# Patient Record
Sex: FEMALE | Race: WHITE | NOT HISPANIC OR LATINO | Employment: OTHER | URBAN - METROPOLITAN AREA
[De-identification: names, ages, dates, MRNs, and addresses within clinical notes are randomized per-mention and may not be internally consistent; named-entity substitution may affect disease eponyms.]

---

## 2022-01-01 ENCOUNTER — DISCHARGE SUMMARY NURSING HOME (OUTPATIENT)
Dept: GERIATRICS | Facility: CLINIC | Age: 82
End: 2022-01-01
Payer: COMMERCIAL

## 2022-01-01 ENCOUNTER — TRANSFERRED RECORDS (OUTPATIENT)
Dept: HEALTH INFORMATION MANAGEMENT | Facility: CLINIC | Age: 82
End: 2022-01-01

## 2022-01-01 ENCOUNTER — APPOINTMENT (OUTPATIENT)
Dept: MRI IMAGING | Facility: CLINIC | Age: 82
End: 2022-01-01
Attending: FAMILY MEDICINE
Payer: MEDICARE

## 2022-01-01 ENCOUNTER — APPOINTMENT (OUTPATIENT)
Dept: CARDIOLOGY | Facility: CLINIC | Age: 82
End: 2022-01-01
Attending: INTERNAL MEDICINE
Payer: MEDICARE

## 2022-01-01 ENCOUNTER — LAB REQUISITION (OUTPATIENT)
Dept: LAB | Facility: CLINIC | Age: 82
End: 2022-01-01
Payer: MEDICARE

## 2022-01-01 ENCOUNTER — TRANSITIONAL CARE UNIT VISIT (OUTPATIENT)
Dept: GERIATRICS | Facility: CLINIC | Age: 82
End: 2022-01-01
Payer: COMMERCIAL

## 2022-01-01 ENCOUNTER — MEDICAL CORRESPONDENCE (OUTPATIENT)
Dept: HEALTH INFORMATION MANAGEMENT | Facility: CLINIC | Age: 82
End: 2022-01-01

## 2022-01-01 ENCOUNTER — LAB REQUISITION (OUTPATIENT)
Dept: LAB | Facility: CLINIC | Age: 82
End: 2022-01-01
Payer: COMMERCIAL

## 2022-01-01 ENCOUNTER — APPOINTMENT (OUTPATIENT)
Dept: PHYSICAL THERAPY | Facility: CLINIC | Age: 82
End: 2022-01-01
Attending: INTERNAL MEDICINE
Payer: MEDICARE

## 2022-01-01 ENCOUNTER — PATIENT OUTREACH (OUTPATIENT)
Dept: CARE COORDINATION | Facility: CLINIC | Age: 82
End: 2022-01-01
Payer: COMMERCIAL

## 2022-01-01 ENCOUNTER — APPOINTMENT (OUTPATIENT)
Dept: CT IMAGING | Facility: CLINIC | Age: 82
End: 2022-01-01
Attending: FAMILY MEDICINE
Payer: MEDICARE

## 2022-01-01 ENCOUNTER — TELEPHONE (OUTPATIENT)
Dept: GERIATRICS | Facility: CLINIC | Age: 82
End: 2022-01-01
Payer: COMMERCIAL

## 2022-01-01 ENCOUNTER — APPOINTMENT (OUTPATIENT)
Dept: OCCUPATIONAL THERAPY | Facility: CLINIC | Age: 82
End: 2022-01-01
Attending: INTERNAL MEDICINE
Payer: MEDICARE

## 2022-01-01 ENCOUNTER — TRANSCRIBE ORDERS (OUTPATIENT)
Dept: OTHER | Age: 82
End: 2022-01-01

## 2022-01-01 ENCOUNTER — TELEPHONE (OUTPATIENT)
Dept: GERIATRICS | Facility: CLINIC | Age: 82
End: 2022-01-01

## 2022-01-01 ENCOUNTER — HOSPITAL ENCOUNTER (OUTPATIENT)
Facility: CLINIC | Age: 82
Setting detail: OBSERVATION
Discharge: HOME | End: 2022-05-07
Attending: FAMILY MEDICINE | Admitting: INTERNAL MEDICINE
Payer: MEDICARE

## 2022-01-01 VITALS
BODY MASS INDEX: 17.93 KG/M2 | WEIGHT: 105 LBS | OXYGEN SATURATION: 99 % | TEMPERATURE: 96.8 F | DIASTOLIC BLOOD PRESSURE: 75 MMHG | HEIGHT: 64 IN | HEART RATE: 81 BPM | SYSTOLIC BLOOD PRESSURE: 106 MMHG | RESPIRATION RATE: 20 BRPM

## 2022-01-01 VITALS
RESPIRATION RATE: 18 BRPM | DIASTOLIC BLOOD PRESSURE: 78 MMHG | BODY MASS INDEX: 17.93 KG/M2 | TEMPERATURE: 98.3 F | HEIGHT: 64 IN | OXYGEN SATURATION: 94 % | WEIGHT: 105 LBS | SYSTOLIC BLOOD PRESSURE: 110 MMHG | HEART RATE: 88 BPM

## 2022-01-01 VITALS
HEIGHT: 64 IN | RESPIRATION RATE: 20 BRPM | BODY MASS INDEX: 18.27 KG/M2 | WEIGHT: 107 LBS | OXYGEN SATURATION: 96 % | TEMPERATURE: 98.2 F | SYSTOLIC BLOOD PRESSURE: 108 MMHG | HEART RATE: 82 BPM | DIASTOLIC BLOOD PRESSURE: 73 MMHG

## 2022-01-01 VITALS
HEART RATE: 63 BPM | RESPIRATION RATE: 20 BRPM | SYSTOLIC BLOOD PRESSURE: 147 MMHG | BODY MASS INDEX: 18.3 KG/M2 | HEIGHT: 64 IN | OXYGEN SATURATION: 95 % | WEIGHT: 107.2 LBS | DIASTOLIC BLOOD PRESSURE: 87 MMHG | TEMPERATURE: 97.1 F

## 2022-01-01 VITALS
TEMPERATURE: 97.5 F | OXYGEN SATURATION: 98 % | RESPIRATION RATE: 20 BRPM | SYSTOLIC BLOOD PRESSURE: 118 MMHG | HEIGHT: 64 IN | BODY MASS INDEX: 17.93 KG/M2 | HEART RATE: 102 BPM | DIASTOLIC BLOOD PRESSURE: 74 MMHG | WEIGHT: 105 LBS

## 2022-01-01 VITALS
SYSTOLIC BLOOD PRESSURE: 129 MMHG | HEIGHT: 64 IN | BODY MASS INDEX: 17.93 KG/M2 | TEMPERATURE: 96.8 F | OXYGEN SATURATION: 97 % | DIASTOLIC BLOOD PRESSURE: 77 MMHG | RESPIRATION RATE: 20 BRPM | WEIGHT: 105 LBS | HEART RATE: 71 BPM

## 2022-01-01 VITALS
HEART RATE: 64 BPM | RESPIRATION RATE: 18 BRPM | HEIGHT: 64 IN | OXYGEN SATURATION: 96 % | WEIGHT: 107 LBS | BODY MASS INDEX: 18.27 KG/M2 | TEMPERATURE: 97 F | DIASTOLIC BLOOD PRESSURE: 57 MMHG | SYSTOLIC BLOOD PRESSURE: 113 MMHG

## 2022-01-01 VITALS
SYSTOLIC BLOOD PRESSURE: 99 MMHG | RESPIRATION RATE: 20 BRPM | HEART RATE: 135 BPM | DIASTOLIC BLOOD PRESSURE: 51 MMHG | BODY MASS INDEX: 17.42 KG/M2 | OXYGEN SATURATION: 98 % | HEIGHT: 64 IN | WEIGHT: 102 LBS | TEMPERATURE: 96.8 F

## 2022-01-01 VITALS
OXYGEN SATURATION: 96 % | BODY MASS INDEX: 18.1 KG/M2 | HEART RATE: 85 BPM | HEIGHT: 64 IN | WEIGHT: 106 LBS | RESPIRATION RATE: 20 BRPM | TEMPERATURE: 97.2 F | DIASTOLIC BLOOD PRESSURE: 80 MMHG | SYSTOLIC BLOOD PRESSURE: 128 MMHG

## 2022-01-01 VITALS
HEART RATE: 66 BPM | SYSTOLIC BLOOD PRESSURE: 105 MMHG | BODY MASS INDEX: 17.75 KG/M2 | TEMPERATURE: 97 F | HEIGHT: 64 IN | RESPIRATION RATE: 20 BRPM | DIASTOLIC BLOOD PRESSURE: 68 MMHG | WEIGHT: 104 LBS | OXYGEN SATURATION: 91 %

## 2022-01-01 DIAGNOSIS — S32.10XD CLOSED FRACTURE OF SACRUM WITH ROUTINE HEALING, UNSPECIFIED PORTION OF SACRUM, SUBSEQUENT ENCOUNTER: Primary | ICD-10-CM

## 2022-01-01 DIAGNOSIS — I20.89 ANGINA OF EFFORT (H): ICD-10-CM

## 2022-01-01 DIAGNOSIS — R52 PAIN MANAGEMENT: ICD-10-CM

## 2022-01-01 DIAGNOSIS — I10 PRIMARY HYPERTENSION: ICD-10-CM

## 2022-01-01 DIAGNOSIS — S22.000A COMPRESSION FRACTURE OF THORACIC VERTEBRA, CLOSED, INITIAL ENCOUNTER (H): ICD-10-CM

## 2022-01-01 DIAGNOSIS — D72.819 DECREASED WHITE BLOOD CELL COUNT, UNSPECIFIED: ICD-10-CM

## 2022-01-01 DIAGNOSIS — R47.01 APHASIA: ICD-10-CM

## 2022-01-01 DIAGNOSIS — Z09 ENCOUNTER FOR FOLLOW-UP EXAMINATION AFTER COMPLETED TREATMENT FOR CONDITIONS OTHER THAN MALIGNANT NEOPLASM: ICD-10-CM

## 2022-01-01 DIAGNOSIS — M81.0 OSTEOPOROSIS, UNSPECIFIED OSTEOPOROSIS TYPE, UNSPECIFIED PATHOLOGICAL FRACTURE PRESENCE: Primary | ICD-10-CM

## 2022-01-01 DIAGNOSIS — I10 ESSENTIAL (PRIMARY) HYPERTENSION: ICD-10-CM

## 2022-01-01 DIAGNOSIS — R53.81 PHYSICAL DECONDITIONING: ICD-10-CM

## 2022-01-01 DIAGNOSIS — Z71.89 OTHER SPECIFIED COUNSELING: ICD-10-CM

## 2022-01-01 DIAGNOSIS — E83.52 HYPERCALCEMIA: ICD-10-CM

## 2022-01-01 DIAGNOSIS — I48.0 PAROXYSMAL ATRIAL FIBRILLATION (H): ICD-10-CM

## 2022-01-01 DIAGNOSIS — E87.6 HYPOKALEMIA: ICD-10-CM

## 2022-01-01 DIAGNOSIS — Z79.01 ADEQUATE ANTICOAGULATION ON ANTICOAGULANT THERAPY: ICD-10-CM

## 2022-01-01 DIAGNOSIS — I48.91 UNSPECIFIED ATRIAL FIBRILLATION (H): ICD-10-CM

## 2022-01-01 DIAGNOSIS — M81.0 OSTEOPOROSIS, UNSPECIFIED OSTEOPOROSIS TYPE, UNSPECIFIED PATHOLOGICAL FRACTURE PRESENCE: ICD-10-CM

## 2022-01-01 DIAGNOSIS — F03.90 DEMENTIA WITHOUT BEHAVIORAL DISTURBANCE, UNSPECIFIED DEMENTIA TYPE: ICD-10-CM

## 2022-01-01 DIAGNOSIS — F03.90 DEMENTIA WITHOUT BEHAVIORAL DISTURBANCE, UNSPECIFIED DEMENTIA TYPE: Primary | ICD-10-CM

## 2022-01-01 DIAGNOSIS — R47.9 SPEECH DISTURBANCE, UNSPECIFIED TYPE: Primary | ICD-10-CM

## 2022-01-01 DIAGNOSIS — Z66 DNR (DO NOT RESUSCITATE): ICD-10-CM

## 2022-01-01 LAB
ALBUMIN UR-MCNC: NEGATIVE MG/DL
ANION GAP SERPL CALCULATED.3IONS-SCNC: 13 MMOL/L (ref 5–18)
ANION GAP SERPL CALCULATED.3IONS-SCNC: 14 MMOL/L (ref 5–18)
ANION GAP SERPL CALCULATED.3IONS-SCNC: 18 MMOL/L (ref 5–18)
ANION GAP SERPL CALCULATED.3IONS-SCNC: 9 MMOL/L (ref 5–18)
APPEARANCE UR: CLEAR
APTT PPP: 28 SECONDS (ref 22–38)
ATRIAL RATE - MUSE: 76 BPM
BASOPHILS # BLD AUTO: 0 10E3/UL (ref 0–0.2)
BASOPHILS # BLD AUTO: 0 10E3/UL (ref 0–0.2)
BASOPHILS NFR BLD AUTO: 1 %
BASOPHILS NFR BLD AUTO: 1 %
BILIRUB UR QL STRIP: NEGATIVE
BUN SERPL-MCNC: 11 MG/DL (ref 8–28)
BUN SERPL-MCNC: 16 MG/DL (ref 8–28)
BUN SERPL-MCNC: 19 MG/DL (ref 8–28)
BUN SERPL-MCNC: 23 MG/DL (ref 8–28)
CA-I BLD-MCNC: 4.7 MG/DL (ref 4.4–5.2)
CALCIUM SERPL-MCNC: 10.8 MG/DL (ref 8.5–10.5)
CALCIUM SERPL-MCNC: 9 MG/DL (ref 8.5–10.5)
CALCIUM SERPL-MCNC: 9.2 MG/DL (ref 8.5–10.5)
CALCIUM SERPL-MCNC: 9.3 MG/DL (ref 8.5–10.5)
CHLORIDE BLD-SCNC: 95 MMOL/L (ref 98–107)
CHLORIDE BLD-SCNC: 95 MMOL/L (ref 98–107)
CHLORIDE BLD-SCNC: 97 MMOL/L (ref 98–107)
CHLORIDE BLD-SCNC: 98 MMOL/L (ref 98–107)
CHOLEST SERPL-MCNC: 129 MG/DL
CO2 SERPL-SCNC: 21 MMOL/L (ref 22–31)
CO2 SERPL-SCNC: 25 MMOL/L (ref 22–31)
CO2 SERPL-SCNC: 27 MMOL/L (ref 22–31)
CO2 SERPL-SCNC: 28 MMOL/L (ref 22–31)
COLOR UR AUTO: ABNORMAL
CREAT BLD-MCNC: 0.7 MG/DL (ref 0.6–1.1)
CREAT SERPL-MCNC: 0.63 MG/DL (ref 0.6–1.1)
CREAT SERPL-MCNC: 0.67 MG/DL (ref 0.6–1.1)
CREAT SERPL-MCNC: 0.76 MG/DL (ref 0.6–1.1)
CREAT SERPL-MCNC: 0.85 MG/DL (ref 0.6–1.1)
DIASTOLIC BLOOD PRESSURE - MUSE: NORMAL MMHG
EOSINOPHIL # BLD AUTO: 0 10E3/UL (ref 0–0.7)
EOSINOPHIL # BLD AUTO: 0.1 10E3/UL (ref 0–0.7)
EOSINOPHIL NFR BLD AUTO: 1 %
EOSINOPHIL NFR BLD AUTO: 1 %
ERYTHROCYTE [DISTWIDTH] IN BLOOD BY AUTOMATED COUNT: 12.1 % (ref 10–15)
ERYTHROCYTE [DISTWIDTH] IN BLOOD BY AUTOMATED COUNT: 12.6 % (ref 10–15)
ERYTHROCYTE [DISTWIDTH] IN BLOOD BY AUTOMATED COUNT: 12.9 % (ref 10–15)
FOLATE SERPL-MCNC: 14.8 NG/ML
GFR SERPL CREATININE-BSD FRML MDRD: 67 ML/MIN/1.73M2
GFR SERPL CREATININE-BSD FRML MDRD: 77 ML/MIN/1.73M2
GFR SERPL CREATININE-BSD FRML MDRD: 86 ML/MIN/1.73M2
GFR SERPL CREATININE-BSD FRML MDRD: 87 ML/MIN/1.73M2
GFR SERPL CREATININE-BSD FRML MDRD: >60 ML/MIN/1.73M2
GLUCOSE BLD-MCNC: 101 MG/DL (ref 70–125)
GLUCOSE BLD-MCNC: 84 MG/DL (ref 70–125)
GLUCOSE BLD-MCNC: 85 MG/DL (ref 70–125)
GLUCOSE BLD-MCNC: 99 MG/DL (ref 70–125)
GLUCOSE BLDC GLUCOMTR-MCNC: 90 MG/DL (ref 70–99)
GLUCOSE BLDC GLUCOMTR-MCNC: 93 MG/DL (ref 70–99)
GLUCOSE BLDC GLUCOMTR-MCNC: 95 MG/DL (ref 70–99)
GLUCOSE BLDC GLUCOMTR-MCNC: 97 MG/DL (ref 70–99)
GLUCOSE UR STRIP-MCNC: NEGATIVE MG/DL
HBA1C MFR BLD: 5.2 %
HCT VFR BLD AUTO: 36.9 % (ref 35–47)
HCT VFR BLD AUTO: 39.9 % (ref 35–47)
HCT VFR BLD AUTO: 40.1 % (ref 35–47)
HDLC SERPL-MCNC: 54 MG/DL
HGB BLD-MCNC: 12.7 G/DL (ref 11.7–15.7)
HGB BLD-MCNC: 13.6 G/DL (ref 11.7–15.7)
HGB BLD-MCNC: 13.7 G/DL (ref 11.7–15.7)
HGB UR QL STRIP: NEGATIVE
HOLD SPECIMEN: NORMAL
IMM GRANULOCYTES # BLD: 0 10E3/UL
IMM GRANULOCYTES # BLD: 0 10E3/UL
IMM GRANULOCYTES NFR BLD: 0 %
IMM GRANULOCYTES NFR BLD: 1 %
INR PPP: 1.52 (ref 0.85–1.15)
INTERPRETATION ECG - MUSE: NORMAL
KETONES UR STRIP-MCNC: ABNORMAL MG/DL
LDLC SERPL CALC-MCNC: 62 MG/DL
LEUKOCYTE ESTERASE UR QL STRIP: ABNORMAL
LYMPHOCYTES # BLD AUTO: 1 10E3/UL (ref 0.8–5.3)
LYMPHOCYTES # BLD AUTO: 1.4 10E3/UL (ref 0.8–5.3)
LYMPHOCYTES NFR BLD AUTO: 23 %
LYMPHOCYTES NFR BLD AUTO: 25 %
MAGNESIUM SERPL-MCNC: 1.5 MG/DL (ref 1.8–2.6)
MAGNESIUM SERPL-MCNC: 1.8 MG/DL (ref 1.8–2.6)
MCH RBC QN AUTO: 34.3 PG (ref 26.5–33)
MCH RBC QN AUTO: 34.7 PG (ref 26.5–33)
MCH RBC QN AUTO: 35.5 PG (ref 26.5–33)
MCHC RBC AUTO-ENTMCNC: 33.9 G/DL (ref 31.5–36.5)
MCHC RBC AUTO-ENTMCNC: 34.3 G/DL (ref 31.5–36.5)
MCHC RBC AUTO-ENTMCNC: 34.4 G/DL (ref 31.5–36.5)
MCV RBC AUTO: 100 FL (ref 78–100)
MCV RBC AUTO: 102 FL (ref 78–100)
MCV RBC AUTO: 103 FL (ref 78–100)
MONOCYTES # BLD AUTO: 0.4 10E3/UL (ref 0–1.3)
MONOCYTES # BLD AUTO: 0.6 10E3/UL (ref 0–1.3)
MONOCYTES NFR BLD AUTO: 10 %
MONOCYTES NFR BLD AUTO: 9 %
MUCOUS THREADS #/AREA URNS LPF: PRESENT /LPF
NEUTROPHILS # BLD AUTO: 2.8 10E3/UL (ref 1.6–8.3)
NEUTROPHILS # BLD AUTO: 3.5 10E3/UL (ref 1.6–8.3)
NEUTROPHILS NFR BLD AUTO: 62 %
NEUTROPHILS NFR BLD AUTO: 66 %
NITRATE UR QL: NEGATIVE
NRBC # BLD AUTO: 0 10E3/UL
NRBC # BLD AUTO: 0 10E3/UL
NRBC BLD AUTO-RTO: 0 /100
NRBC BLD AUTO-RTO: 0 /100
P AXIS - MUSE: 64 DEGREES
PH UR STRIP: 6.5 [PH] (ref 5–7)
PHQ9 SCORE: 2
PLATELET # BLD AUTO: 224 10E3/UL (ref 150–450)
PLATELET # BLD AUTO: 233 10E3/UL (ref 150–450)
PLATELET # BLD AUTO: 245 10E3/UL (ref 150–450)
POTASSIUM BLD-SCNC: 3 MMOL/L (ref 3.5–5)
POTASSIUM BLD-SCNC: 3.1 MMOL/L (ref 3.5–5)
POTASSIUM BLD-SCNC: 3.3 MMOL/L (ref 3.5–5)
POTASSIUM BLD-SCNC: 3.5 MMOL/L (ref 3.5–5)
POTASSIUM BLD-SCNC: 3.7 MMOL/L (ref 3.5–5)
POTASSIUM BLD-SCNC: 3.9 MMOL/L (ref 3.5–5)
POTASSIUM BLD-SCNC: 5.4 MMOL/L (ref 3.5–5)
PR INTERVAL - MUSE: 136 MS
PTH-INTACT SERPL-MCNC: 34 PG/ML (ref 15–65)
PTH-INTACT SERPL-MCNC: 37 PG/ML (ref 10–86)
QRS DURATION - MUSE: 82 MS
QT - MUSE: 408 MS
QTC - MUSE: 459 MS
R AXIS - MUSE: 4 DEGREES
RBC # BLD AUTO: 3.7 10E6/UL (ref 3.8–5.2)
RBC # BLD AUTO: 3.86 10E6/UL (ref 3.8–5.2)
RBC # BLD AUTO: 3.92 10E6/UL (ref 3.8–5.2)
RBC URINE: 2 /HPF
SARS-COV-2 RNA RESP QL NAA+PROBE: NEGATIVE
SODIUM SERPL-SCNC: 132 MMOL/L (ref 136–145)
SODIUM SERPL-SCNC: 134 MMOL/L (ref 136–145)
SODIUM SERPL-SCNC: 136 MMOL/L (ref 136–145)
SODIUM SERPL-SCNC: 138 MMOL/L (ref 136–145)
SP GR UR STRIP: >1.05 (ref 1–1.03)
SQUAMOUS EPITHELIAL: 11 /HPF
SYSTOLIC BLOOD PRESSURE - MUSE: NORMAL MMHG
T AXIS - MUSE: 75 DEGREES
TRIGL SERPL-MCNC: 67 MG/DL
TROPONIN I SERPL-MCNC: 0.04 NG/ML (ref 0–0.29)
TSH SERPL DL<=0.005 MIU/L-ACNC: 3.6 UIU/ML (ref 0.3–5)
UROBILINOGEN UR STRIP-MCNC: <2 MG/DL
VENTRICULAR RATE- MUSE: 76 BPM
VIT B12 SERPL-MCNC: 255 PG/ML (ref 213–816)
WBC # BLD AUTO: 3.1 10E3/UL (ref 4–11)
WBC # BLD AUTO: 4.2 10E3/UL (ref 4–11)
WBC # BLD AUTO: 5.5 10E3/UL (ref 4–11)
WBC URINE: 9 /HPF

## 2022-01-01 PROCEDURE — G0378 HOSPITAL OBSERVATION PER HR: HCPCS

## 2022-01-01 PROCEDURE — 82962 GLUCOSE BLOOD TEST: CPT

## 2022-01-01 PROCEDURE — 82330 ASSAY OF CALCIUM: CPT | Mod: ORL | Performed by: FAMILY MEDICINE

## 2022-01-01 PROCEDURE — 84443 ASSAY THYROID STIM HORMONE: CPT | Performed by: INTERNAL MEDICINE

## 2022-01-01 PROCEDURE — 84132 ASSAY OF SERUM POTASSIUM: CPT | Performed by: FAMILY MEDICINE

## 2022-01-01 PROCEDURE — 81001 URINALYSIS AUTO W/SCOPE: CPT | Performed by: INTERNAL MEDICINE

## 2022-01-01 PROCEDURE — 99309 SBSQ NF CARE MODERATE MDM 30: CPT | Performed by: FAMILY MEDICINE

## 2022-01-01 PROCEDURE — 99217 PR OBSERVATION CARE DISCHARGE: CPT | Performed by: FAMILY MEDICINE

## 2022-01-01 PROCEDURE — 99207 PR CDG-MDM COMPONENT: MEETS MODERATE - DOWN CODED: CPT | Performed by: NURSE PRACTITIONER

## 2022-01-01 PROCEDURE — 99315 NF DSCHRG MGMT 30 MIN/LESS: CPT | Performed by: FAMILY MEDICINE

## 2022-01-01 PROCEDURE — 36415 COLL VENOUS BLD VENIPUNCTURE: CPT | Performed by: FAMILY MEDICINE

## 2022-01-01 PROCEDURE — 99220 PR INITIAL OBSERVATION CARE,LEVEL III: CPT | Performed by: INTERNAL MEDICINE

## 2022-01-01 PROCEDURE — 99285 EMERGENCY DEPT VISIT HI MDM: CPT | Mod: 25

## 2022-01-01 PROCEDURE — 36415 COLL VENOUS BLD VENIPUNCTURE: CPT | Performed by: INTERNAL MEDICINE

## 2022-01-01 PROCEDURE — 36415 COLL VENOUS BLD VENIPUNCTURE: CPT

## 2022-01-01 PROCEDURE — 80048 BASIC METABOLIC PNL TOTAL CA: CPT | Performed by: NURSE PRACTITIONER

## 2022-01-01 PROCEDURE — 99309 SBSQ NF CARE MODERATE MDM 30: CPT | Performed by: NURSE PRACTITIONER

## 2022-01-01 PROCEDURE — 80048 BASIC METABOLIC PNL TOTAL CA: CPT | Performed by: INTERNAL MEDICINE

## 2022-01-01 PROCEDURE — 99448 NTRPROF PH1/NTRNET/EHR 21-30: CPT | Performed by: NURSE PRACTITIONER

## 2022-01-01 PROCEDURE — 80061 LIPID PANEL: CPT | Performed by: INTERNAL MEDICINE

## 2022-01-01 PROCEDURE — 84132 ASSAY OF SERUM POTASSIUM: CPT

## 2022-01-01 PROCEDURE — 84484 ASSAY OF TROPONIN QUANT: CPT | Performed by: FAMILY MEDICINE

## 2022-01-01 PROCEDURE — 87635 SARS-COV-2 COVID-19 AMP PRB: CPT | Performed by: FAMILY MEDICINE

## 2022-01-01 PROCEDURE — 80048 BASIC METABOLIC PNL TOTAL CA: CPT | Mod: ORL | Performed by: FAMILY MEDICINE

## 2022-01-01 PROCEDURE — P9604 ONE-WAY ALLOW PRORATED TRIP: HCPCS

## 2022-01-01 PROCEDURE — 36415 COLL VENOUS BLD VENIPUNCTURE: CPT | Performed by: NURSE PRACTITIONER

## 2022-01-01 PROCEDURE — 82310 ASSAY OF CALCIUM: CPT | Performed by: FAMILY MEDICINE

## 2022-01-01 PROCEDURE — 99304 1ST NF CARE SF/LOW MDM 25: CPT | Performed by: FAMILY MEDICINE

## 2022-01-01 PROCEDURE — 70553 MRI BRAIN STEM W/O & W/DYE: CPT

## 2022-01-01 PROCEDURE — 85730 THROMBOPLASTIN TIME PARTIAL: CPT | Performed by: FAMILY MEDICINE

## 2022-01-01 PROCEDURE — 83735 ASSAY OF MAGNESIUM: CPT | Performed by: NURSE PRACTITIONER

## 2022-01-01 PROCEDURE — 99207 PR CDG-HISTORY COMPONENT: MEETS DETAILED - DOWN CODED LACK OF PFSH: CPT | Performed by: FAMILY MEDICINE

## 2022-01-01 PROCEDURE — 83036 HEMOGLOBIN GLYCOSYLATED A1C: CPT | Performed by: INTERNAL MEDICINE

## 2022-01-01 PROCEDURE — 85049 AUTOMATED PLATELET COUNT: CPT | Performed by: FAMILY MEDICINE

## 2022-01-01 PROCEDURE — C9803 HOPD COVID-19 SPEC COLLECT: HCPCS

## 2022-01-01 PROCEDURE — 70498 CT ANGIOGRAPHY NECK: CPT

## 2022-01-01 PROCEDURE — 255N000002 HC RX 255 OP 636: Performed by: FAMILY MEDICINE

## 2022-01-01 PROCEDURE — 93306 TTE W/DOPPLER COMPLETE: CPT

## 2022-01-01 PROCEDURE — 250N000013 HC RX MED GY IP 250 OP 250 PS 637: Performed by: INTERNAL MEDICINE

## 2022-01-01 PROCEDURE — 83970 ASSAY OF PARATHORMONE: CPT | Mod: ORL | Performed by: FAMILY MEDICINE

## 2022-01-01 PROCEDURE — P9604 ONE-WAY ALLOW PRORATED TRIP: HCPCS | Performed by: FAMILY MEDICINE

## 2022-01-01 PROCEDURE — 82565 ASSAY OF CREATININE: CPT

## 2022-01-01 PROCEDURE — 85027 COMPLETE CBC AUTOMATED: CPT | Performed by: NURSE PRACTITIONER

## 2022-01-01 PROCEDURE — 93005 ELECTROCARDIOGRAM TRACING: CPT | Performed by: FAMILY MEDICINE

## 2022-01-01 PROCEDURE — A9585 GADOBUTROL INJECTION: HCPCS | Performed by: FAMILY MEDICINE

## 2022-01-01 PROCEDURE — 99310 SBSQ NF CARE HIGH MDM 45: CPT | Performed by: FAMILY MEDICINE

## 2022-01-01 PROCEDURE — 93306 TTE W/DOPPLER COMPLETE: CPT | Mod: 26 | Performed by: INTERNAL MEDICINE

## 2022-01-01 PROCEDURE — 82607 VITAMIN B-12: CPT | Performed by: INTERNAL MEDICINE

## 2022-01-01 PROCEDURE — 85025 COMPLETE CBC W/AUTO DIFF WBC: CPT

## 2022-01-01 PROCEDURE — 99449 NTRPROF PH1/NTRNET/EHR 31/>: CPT | Performed by: NURSE PRACTITIONER

## 2022-01-01 PROCEDURE — 83970 ASSAY OF PARATHORMONE: CPT | Performed by: INTERNAL MEDICINE

## 2022-01-01 PROCEDURE — 97165 OT EVAL LOW COMPLEX 30 MIN: CPT | Mod: GO

## 2022-01-01 PROCEDURE — P9604 ONE-WAY ALLOW PRORATED TRIP: HCPCS | Performed by: NURSE PRACTITIONER

## 2022-01-01 PROCEDURE — 82374 ASSAY BLOOD CARBON DIOXIDE: CPT | Performed by: FAMILY MEDICINE

## 2022-01-01 PROCEDURE — 82746 ASSAY OF FOLIC ACID SERUM: CPT | Performed by: INTERNAL MEDICINE

## 2022-01-01 PROCEDURE — 250N000011 HC RX IP 250 OP 636: Performed by: FAMILY MEDICINE

## 2022-01-01 PROCEDURE — 85610 PROTHROMBIN TIME: CPT | Performed by: FAMILY MEDICINE

## 2022-01-01 PROCEDURE — 83735 ASSAY OF MAGNESIUM: CPT | Performed by: FAMILY MEDICINE

## 2022-01-01 PROCEDURE — 70496 CT ANGIOGRAPHY HEAD: CPT

## 2022-01-01 PROCEDURE — 97116 GAIT TRAINING THERAPY: CPT | Mod: GP | Performed by: PHYSICAL THERAPIST

## 2022-01-01 PROCEDURE — 97162 PT EVAL MOD COMPLEX 30 MIN: CPT | Mod: GP | Performed by: PHYSICAL THERAPIST

## 2022-01-01 RX ORDER — METOPROLOL TARTRATE 25 MG/1
25 TABLET, FILM COATED ORAL 2 TIMES DAILY
Status: DISCONTINUED | OUTPATIENT
Start: 2022-01-01 | End: 2022-01-01 | Stop reason: HOSPADM

## 2022-01-01 RX ORDER — LIDOCAINE 50 MG/G
1 PATCH TOPICAL EVERY 24 HOURS
COMMUNITY
End: 2022-01-01

## 2022-01-01 RX ORDER — RALOXIFENE HYDROCHLORIDE 60 MG/1
60 TABLET, FILM COATED ORAL DAILY
Status: DISCONTINUED | OUTPATIENT
Start: 2022-01-01 | End: 2022-01-01 | Stop reason: HOSPADM

## 2022-01-01 RX ORDER — AMOXICILLIN 250 MG
1 CAPSULE ORAL 2 TIMES DAILY PRN
COMMUNITY
End: 2022-01-01

## 2022-01-01 RX ORDER — SIMETHICONE 80 MG
160 TABLET,CHEWABLE ORAL 2 TIMES DAILY
COMMUNITY
End: 2022-01-01

## 2022-01-01 RX ORDER — MAGNESIUM OXIDE 400 MG/1
400 TABLET ORAL DAILY
Status: DISCONTINUED | OUTPATIENT
Start: 2022-01-01 | End: 2022-01-01 | Stop reason: HOSPADM

## 2022-01-01 RX ORDER — CLOPIDOGREL BISULFATE 75 MG/1
75 TABLET ORAL DAILY
Status: DISCONTINUED | OUTPATIENT
Start: 2022-01-01 | End: 2022-01-01 | Stop reason: HOSPADM

## 2022-01-01 RX ORDER — HYDROCHLOROTHIAZIDE 12.5 MG/1
25 TABLET ORAL DAILY
COMMUNITY
End: 2022-01-01

## 2022-01-01 RX ORDER — METHOCARBAMOL 750 MG/1
750 TABLET, FILM COATED ORAL EVERY 6 HOURS PRN
COMMUNITY
End: 2022-01-01

## 2022-01-01 RX ORDER — PSEUDOEPHEDRINE HCL 30 MG
1 TABLET ORAL DAILY
Status: ON HOLD | COMMUNITY
Start: 2022-01-01 | End: 2022-01-01

## 2022-01-01 RX ORDER — ACETAMINOPHEN 500 MG
1000 TABLET ORAL EVERY 6 HOURS PRN
COMMUNITY

## 2022-01-01 RX ORDER — ATORVASTATIN CALCIUM 40 MG/1
40 TABLET, FILM COATED ORAL AT BEDTIME
COMMUNITY

## 2022-01-01 RX ORDER — VITAMIN B COMPLEX
50 TABLET ORAL DAILY
Status: DISCONTINUED | OUTPATIENT
Start: 2022-01-01 | End: 2022-01-01 | Stop reason: HOSPADM

## 2022-01-01 RX ORDER — TRAMADOL HYDROCHLORIDE 50 MG/1
50 TABLET ORAL EVERY 4 HOURS PRN
COMMUNITY
End: 2022-01-01

## 2022-01-01 RX ORDER — LISINOPRIL 30 MG/1
10 TABLET ORAL DAILY
COMMUNITY
End: 2022-01-01

## 2022-01-01 RX ORDER — ATORVASTATIN CALCIUM 40 MG/1
40 TABLET, FILM COATED ORAL AT BEDTIME
Status: DISCONTINUED | OUTPATIENT
Start: 2022-01-01 | End: 2022-01-01 | Stop reason: HOSPADM

## 2022-01-01 RX ORDER — VITAMIN B COMPLEX
50 TABLET ORAL DAILY
COMMUNITY
Start: 2022-01-01

## 2022-01-01 RX ORDER — CLOPIDOGREL BISULFATE 75 MG/1
75 TABLET ORAL DAILY
COMMUNITY

## 2022-01-01 RX ORDER — ONDANSETRON 4 MG/1
4 TABLET, ORALLY DISINTEGRATING ORAL EVERY 6 HOURS PRN
COMMUNITY

## 2022-01-01 RX ORDER — ONDANSETRON 4 MG/1
4 TABLET, ORALLY DISINTEGRATING ORAL EVERY 6 HOURS PRN
Status: DISCONTINUED | OUTPATIENT
Start: 2022-01-01 | End: 2022-01-01 | Stop reason: HOSPADM

## 2022-01-01 RX ORDER — IOPAMIDOL 755 MG/ML
100 INJECTION, SOLUTION INTRAVASCULAR ONCE
Status: COMPLETED | OUTPATIENT
Start: 2022-01-01 | End: 2022-01-01

## 2022-01-01 RX ORDER — EZETIMIBE 10 MG/1
10 TABLET ORAL DAILY
Status: DISCONTINUED | OUTPATIENT
Start: 2022-01-01 | End: 2022-01-01 | Stop reason: HOSPADM

## 2022-01-01 RX ORDER — ACETAMINOPHEN 500 MG
1000 TABLET ORAL EVERY 6 HOURS PRN
Status: DISCONTINUED | OUTPATIENT
Start: 2022-01-01 | End: 2022-01-01 | Stop reason: HOSPADM

## 2022-01-01 RX ORDER — FENOPROFEN CALCIUM 600 MG
600 TABLET ORAL DAILY
COMMUNITY
End: 2022-01-01

## 2022-01-01 RX ORDER — SENNOSIDES 8.6 MG
650 CAPSULE ORAL EVERY 24 HOURS
COMMUNITY
End: 2022-01-01

## 2022-01-01 RX ORDER — METOPROLOL TARTRATE 25 MG/1
25 TABLET, FILM COATED ORAL 2 TIMES DAILY
COMMUNITY

## 2022-01-01 RX ORDER — EZETIMIBE 10 MG/1
10 TABLET ORAL DAILY
COMMUNITY

## 2022-01-01 RX ORDER — NITROGLYCERIN 0.4 MG/1
0.4 TABLET SUBLINGUAL EVERY 5 MIN PRN
COMMUNITY

## 2022-01-01 RX ORDER — NITROGLYCERIN 0.4 MG/1
0.4 TABLET SUBLINGUAL EVERY 5 MIN PRN
Status: DISCONTINUED | OUTPATIENT
Start: 2022-01-01 | End: 2022-01-01 | Stop reason: HOSPADM

## 2022-01-01 RX ORDER — GADOBUTROL 604.72 MG/ML
5 INJECTION INTRAVENOUS ONCE
Status: COMPLETED | OUTPATIENT
Start: 2022-01-01 | End: 2022-01-01

## 2022-01-01 RX ORDER — MAGNESIUM OXIDE 400 MG/1
400 TABLET ORAL DAILY
COMMUNITY
Start: 2022-01-01

## 2022-01-01 RX ORDER — RALOXIFENE HYDROCHLORIDE 60 MG/1
60 TABLET, FILM COATED ORAL DAILY
COMMUNITY

## 2022-01-01 RX ORDER — HYDROCHLOROTHIAZIDE 25 MG/1
50 TABLET ORAL DAILY
Status: DISCONTINUED | OUTPATIENT
Start: 2022-01-01 | End: 2022-01-01 | Stop reason: HOSPADM

## 2022-01-01 RX ORDER — POLYETHYLENE GLYCOL 3350 17 G/17G
1 POWDER, FOR SOLUTION ORAL DAILY
COMMUNITY
End: 2022-01-01

## 2022-01-01 RX ORDER — HYDROCHLOROTHIAZIDE 50 MG/1
50 TABLET ORAL DAILY
COMMUNITY
Start: 2022-01-01

## 2022-01-01 RX ADMIN — CLOPIDOGREL BISULFATE 75 MG: 75 TABLET ORAL at 08:08

## 2022-01-01 RX ADMIN — METOPROLOL TARTRATE 25 MG: 25 TABLET, FILM COATED ORAL at 08:08

## 2022-01-01 RX ADMIN — ACETAMINOPHEN 1000 MG: 500 TABLET ORAL at 13:13

## 2022-01-01 RX ADMIN — RALOXIFENE 60 MG: 60 TABLET ORAL at 08:08

## 2022-01-01 RX ADMIN — APIXABAN 2.5 MG: 2.5 TABLET, FILM COATED ORAL at 08:08

## 2022-01-01 RX ADMIN — APIXABAN 2.5 MG: 2.5 TABLET, FILM COATED ORAL at 20:19

## 2022-01-01 RX ADMIN — Medication 50 MCG: at 08:08

## 2022-01-01 RX ADMIN — ATORVASTATIN CALCIUM 40 MG: 40 TABLET, FILM COATED ORAL at 22:31

## 2022-01-01 RX ADMIN — Medication 400 MG: at 08:08

## 2022-01-01 RX ADMIN — HYDROCHLOROTHIAZIDE 50 MG: 25 TABLET ORAL at 08:08

## 2022-01-01 RX ADMIN — GADOBUTROL 5 ML: 604.72 INJECTION INTRAVENOUS at 14:20

## 2022-01-01 RX ADMIN — EZETIMIBE 10 MG: 10 TABLET ORAL at 08:08

## 2022-01-01 RX ADMIN — IOPAMIDOL 75 ML: 755 INJECTION, SOLUTION INTRAVENOUS at 12:08

## 2022-01-01 RX ADMIN — METOPROLOL TARTRATE 25 MG: 25 TABLET, FILM COATED ORAL at 20:20

## 2022-01-01 ASSESSMENT — MIFFLIN-ST. JEOR
SCORE: 916.28
SCORE: 916.28
SCORE: 902.67
SCORE: 920.81
SCORE: 911.74
SCORE: 916.28
SCORE: 916.28

## 2022-01-01 ASSESSMENT — ENCOUNTER SYMPTOMS
BRUISES/BLEEDS EASILY: 1
WEAKNESS: 0
NECK PAIN: 0
TROUBLE SWALLOWING: 0
BACK PAIN: 0
FACIAL ASYMMETRY: 0
HEADACHES: 0
CONFUSION: 1
NUMBNESS: 0
SPEECH DIFFICULTY: 1
ABDOMINAL PAIN: 0

## 2022-01-31 PROBLEM — M70.61 TROCHANTERIC BURSITIS OF RIGHT HIP: Status: ACTIVE | Noted: 2017-05-23

## 2022-01-31 PROBLEM — H91.90 HEARING LOSS: Status: ACTIVE | Noted: 2018-06-05

## 2022-01-31 PROBLEM — E78.5 DYSLIPIDEMIA: Status: ACTIVE | Noted: 2021-06-25

## 2022-01-31 PROBLEM — R07.9 CHEST PAIN: Status: ACTIVE | Noted: 2021-11-03

## 2022-01-31 PROBLEM — J45.909 RAD (REACTIVE AIRWAY DISEASE): Status: ACTIVE | Noted: 2022-01-01

## 2022-01-31 PROBLEM — M81.0 OSTEOPOROSIS: Status: ACTIVE | Noted: 2022-01-01

## 2022-01-31 PROBLEM — Z66 DNR (DO NOT RESUSCITATE): Status: ACTIVE | Noted: 2020-06-19

## 2022-01-31 PROBLEM — R41.3 SHORT-TERM MEMORY LOSS: Status: ACTIVE | Noted: 2020-06-19

## 2022-01-31 PROBLEM — I48.91 NEW ONSET A-FIB (H): Status: ACTIVE | Noted: 2021-11-03

## 2022-01-31 PROBLEM — L82.1 OTHER SEBORRHEIC KERATOSIS: Status: ACTIVE | Noted: 2017-05-23

## 2022-01-31 PROBLEM — I20.89 ANGINA OF EFFORT (H): Status: ACTIVE | Noted: 2021-06-25

## 2022-01-31 NOTE — LETTER
1/31/2022        RE: Mary Mederos   [address]  [facility] [address]        [facility] GERIATRIC SERVICES  Chief Complaint   Patient presents with     [facility] F/U     [facility] Medical Record Number:  [id number]  Place of Service where encounter took place:  [facility] ([facility]) [[id number]]  Code Status:  No Order     HISTORY:      HPI:  Mary Mederos  is 81 year old (1940) undergoing physical and occupational therapy.   She is with past medical history hypertension, hyperlipidemia, atrial fibrillation currently on Eliquis, Per hospital records she was brought to the  ED by ambulance after she fell down 5-10 stairs at her home. Patient's variable alarm went off and alerted EMS. When EMS got to the scene her family that were living next door had lifted her off the floor and she was sitting in a chair.  On arrival in the ED patient was awake and alert. She denied any headache. C-collar was in place. She did complain of pain in both her upper back and lower back. Pain was worse with movement. She denies any shortness of breath. She states that she felt that she had stumbled. She denied any lightheadedness chest pain or palpitations prior to the fall. She was found to have a T7 fracture.    Today she is seen to review vital signs, labs, pain management, and to establish care. She denied chest pain shortness of breath cough congestion constipation or diarrhea. She did have an elevated pulse of 135 however she is now rhythm controlled. Patient tells writer that she was cardioverted in the hospital. She was refusing her calcium and vitamin D tablets. Writer spoke with her regarding the importance of taking them due to her fractures and it appeared it was because the tablets were too large. She will attempt to take them cut in half. She does rate her pain 2 out of 10 and pain controlled with current medications. She did have intermittent low blood pressures and denied dizziness. Labs pending. DIANE  "when OOB.      ALLERGIES:Penicillins    PAST MEDICAL HISTORY: History reviewed. No pertinent past medical history.    PAST SURGICAL HISTORY:   has no past surgical history on file.    FAMILY HISTORY: family history is not on file.    SOCIAL HISTORY:  has an unknown smoking status. She has never used smokeless tobacco.    ROS:  Constitutional: Negative for activity change, appetite change, fatigue and fever.   HENT: Negative for congestion.    Respiratory: Negative for cough, shortness of breath and wheezing.    Cardiovascular: Negative for chest pain and leg swelling. Atrial fibrillation   Gastrointestinal: Negative for abdominal distention, abdominal pain, constipation, diarrhea and nausea.   Genitourinary: Negative for dysuria.   Musculoskeletal: Negative for arthralgia.positive for back pain.   Skin: Negative for color change and wound.   Neurological: Negative for dizziness.   Psychiatric/Behavioral: Negative for agitation, behavioral problems and confusion.     Physical Exam:  Constitutional:       Appearance: Patient is well-developed.   HENT:      Head: Normocephalic.   Eyes:      Conjunctiva/sclera: Conjunctivae normal.   Neck:      Musculoskeletal: Normal range of motion.   Cardiovascular:      Rate and Rhythm: Normal rate and regular rhythm.      Heart sounds: Normal heart sounds. No murmur.   Pulmonary:      Effort: No respiratory distress.      Breath sounds: Normal breath sounds. No wheezing or rales.   Abdominal:      General: Bowel sounds are normal. There is no distension.      Palpations: Abdomen is soft.      Tenderness: There is no abdominal tenderness.   Musculoskeletal:       Normal range of motion.     Skin:General:        Skin is warm.   Neurological:         Mental Status: Patient is alert and oriented to person, place, and time.   Psychiatric:         Behavior: Behavior normal.     Vitals:BP 99/51   Pulse (!) 135   Temp 96.8  F (36  C)   Resp 20   Ht 1.626 m (5' 4\")   Wt 46.3 kg (102 " lb)   SpO2 98%   BMI 17.51 kg/m   and Body mass index is 17.51 kg/m .    Lab/Diagnostic data:   Recent Results (from the past 240 hour(s))   Basic metabolic panel    Collection Time: 02/01/22  6:59 AM   Result Value Ref Range    Sodium 132 (L) 136 - 145 mmol/L    Potassium 3.0 (L) 3.5 - 5.0 mmol/L    Chloride 95 (L) 98 - 107 mmol/L    Carbon Dioxide (CO2) 28 22 - 31 mmol/L    Anion Gap 9 5 - 18 mmol/L    Urea Nitrogen 23 8 - 28 mg/dL    Creatinine 0.67 0.60 - 1.10 mg/dL    Calcium 9.3 8.5 - 10.5 mg/dL    Glucose 85 70 - 125 mg/dL    GFR Estimate 86 >60 mL/min/1.73m2   CBC with platelets    Collection Time: 02/01/22  6:59 AM   Result Value Ref Range    WBC Count 3.1 (L) 4.0 - 11.0 10e3/uL    RBC Count 3.70 (L) 3.80 - 5.20 10e6/uL    Hemoglobin 12.7 11.7 - 15.7 g/dL    Hematocrit 36.9 35.0 - 47.0 %     78 - 100 fL    MCH 34.3 (H) 26.5 - 33.0 pg    MCHC 34.4 31.5 - 36.5 g/dL    RDW 12.6 10.0 - 15.0 %    Platelet Count 245 150 - 450 10e3/uL   Magnesium    Collection Time: 02/01/22  6:59 AM   Result Value Ref Range    Magnesium 1.5 (L) 1.8 - 2.6 mg/dL       MEDICATIONS:     Review of your medicines          Accurate as of January 31, 2022 11:59 PM. If you have any questions, ask your nurse or doctor.            CONTINUE these medicines which have NOT CHANGED      Dose / Directions   * acetaminophen 650 MG CR tablet  Commonly known as: TYLENOL      Dose: 650 mg  Take 650 mg by mouth every 24 hours  Refills: 0     * acetaminophen 500 MG tablet  Commonly known as: TYLENOL      Dose: 1,000 mg  Take 1,000 mg by mouth 3 times daily  Refills: 0     apixaban ANTICOAGULANT 2.5 MG tablet  Commonly known as: ELIQUIS      Dose: 2.5 mg  Take 2.5 mg by mouth 2 times daily  Refills: 0     atorvastatin 40 MG tablet  Commonly known as: LIPITOR      Dose: 40 mg  Take 40 mg by mouth daily  Refills: 0     calcium carbonate-vitamin D 500-200 MG-UNIT tablet  Commonly known as: OSCAL w/D      Dose: 1 tablet  Take 1 tablet by mouth  daily  Refills: 0     clopidogrel 75 MG tablet  Commonly known as: PLAVIX      Dose: 75 mg  Take 75 mg by mouth daily  Refills: 0     diclofenac 1 % topical gel  Commonly known as: VOLTAREN      Apply topically every 12 hours  Refills: 0     ezetimibe 10 MG tablet  Commonly known as: ZETIA      Dose: 10 mg  Take 10 mg by mouth daily  Refills: 0     fenoprofen calcium 600 MG Tabs tablet      Dose: 600 mg  Take 600 mg by mouth daily  Refills: 0     hydrochlorothiazide 12.5 MG tablet  Commonly known as: HYDRODIURIL      Dose: 50 mg  Take 50 mg by mouth daily  Refills: 0     lidocaine 5 % patch  Commonly known as: LIDODERM      Dose: 1 patch  Place 1 patch onto the skin every 24 hours To prevent lidocaine toxicity, patient should be patch free for 12 hrs daily.  Refills: 0     lisinopril 30 MG tablet  Commonly known as: ZESTRIL      Dose: 30 mg  Take 30 mg by mouth daily  Refills: 0     methocarbamol 750 MG tablet  Commonly known as: ROBAXIN      Dose: 750 mg  Take 750 mg by mouth every 6 hours as needed for muscle spasms  Refills: 0     metoprolol tartrate 25 MG tablet  Commonly known as: LOPRESSOR      Dose: 25 mg  Take 25 mg by mouth 2 times daily  Refills: 0     nitroGLYcerin 0.4 MG sublingual tablet  Commonly known as: NITROSTAT      Dose: 0.4 mg  Place 0.4 mg under the tongue every 5 minutes as needed for chest pain For chest pain place 1 tablet under the tongue every 5 minutes for 3 doses. If symptoms persist 5 minutes after 1st dose call 911.  Refills: 0     ondansetron 4 MG ODT tab  Commonly known as: ZOFRAN-ODT      Dose: 4 mg  Take 4 mg by mouth every 6 hours as needed for nausea  Refills: 0     polyethylene glycol 17 g packet  Commonly known as: MIRALAX      Dose: 1 packet  Take 1 packet by mouth daily  Refills: 0     raloxifene 60 MG tablet  Commonly known as: EVISTA      Dose: 60 mg  Take 60 mg by mouth daily  Refills: 0     senna-docusate 8.6-50 MG tablet  Commonly known as: SENOKOT-S/PERICOLACE       Dose: 1 tablet  Take 1 tablet by mouth 2 times daily as needed for constipation  Refills: 0     simethicone 80 MG chewable tablet  Commonly known as: MYLICON      Dose: 160 mg  Take 160 mg by mouth 2 times daily  Refills: 0     traMADol 50 MG tablet  Commonly known as: ULTRAM      Dose: 50 mg  Take 50 mg by mouth every 4 hours as needed for severe pain  Refills: 0     * Vitamin D3 125 MCG (5000 UT) tablet  Commonly known as: CHOLECALCIFEROL      Dose: 125 mcg  Take 125 mcg by mouth daily  Refills: 0     * Vitamin D3 25 mcg (1000 units) tablet  Commonly known as: CHOLECALCIFEROL      Dose: 25 mcg  Take 25 mcg by mouth daily  Refills: 0         * This list has 4 medication(s) that are the same as other medications prescribed for you. Read the directions carefully, and ask your doctor or other care provider to review them with you.                ASSESSMENT/PLAN  Encounter Diagnoses   Name Primary?     Osteoporosis, unspecified osteoporosis type, unspecified pathological fracture presence Yes     Pain management      Physical deconditioning      Osteoporosis patient with pathological T7 fracture. Continue calcium carbonate, calcium citrate, vitamin D, raloxifene    Pain management tramadol 50 mg every 4 hours as needed, lidocaine patch as scheduled, diclofenac gel as needed, extended release and extra strength Tylenol    Physical deconditioning PT OT    Atrial fibrillation with Toprol tartrate 25 mg twice daily, Eliquis 2.5 mg twice daily    HDL continue atorvastatin    Electronically signed by: Poornima Messina CNP        Sincerely,        Poornima Messina CNP

## 2022-01-31 NOTE — PROGRESS NOTES
[facility] GERIATRIC SERVICES       Patient Mary Mederos  MRN: [id number]        Reason for Visit     Chief Complaint   Patient presents with     RECHECK     Initial       Code Status     CPR/Full code     Assessment     Fall  Sacral fracture  Compression fracture T7  HTN WITH hypotension  Electrolyte imbalance with low potassium and magnesium  Generalized weakness  leukopenia white count is 3.1  osteopenia on multiple doses of calcium supplementation    Plan     Pt is admitted to TCU for strengthening and rehab.  Given conservative treatment  Cont tlso  WBAT  Pain controlled optimized and pt is on scheduled tylenol  Continue with same  Using tramadol sparingly  Follow-up with ortho scheduled in [place] - pt reports she pans to shift care to [place]  Advised to make appt for next week  BP review done and she is on multiple antihypertensives  Admission Bp was 76/36  Today prior to meds she has low BP  Advised to hold all antihypertensives  Hold parameters given for lisinopril  Check orthoststics  Monitor trends and reduce medication if Bp remain low  Advised fluids  R/c labs show low K -critical level of 3  Give K 60 meq x 2 today  Start K 20 meq daily  R/c K in 1-2 d  laura 400 mg daily   R/c levels in 1 -2 weeks  medication review done and she is on multiple calcium and vitamin D supplementations.  I'm not sure why she is taking 3 separate doses of vitamin D and calcium.  We'll discontinue all these orders and keep her on calcium daily along with vitamin D 2000 units daily.  Cont evista  also noted to have  White count of 3.1.  She does not appear to be septic.  She is afebrile.  Would like to repeat her CBC in 1 week's time and get a peripheral smear also.  Recheck labs  Continue with PT/OT-appears to be quite independent living by herself and not using any assist device in the past hopeful to get back to her prior level of functioning    History     Patient is a very pleasant 81 year old female who is admitted  to TCU  She admitted post fall does not recall how  She was admitted in hospital with sacral fracture  This will be treated conservatively  She was noted to have a T7 compression fracture  Saw ortho has been fitted with TLSO  Pain was optimized and she reports good pain control.she is not using much tramadol  BP remain very low and she is on 3 antihypertensives  Reports low BP at home.    Chief Complaint   Patient presents with     RECHECK     Initial       Past Medical & Surgical History     HTN  [facility]  HLD    Past Social History     Reviewed,  has an unknown smoking status. She has never used smokeless tobacco.   Does have wine occasionaly    Family History     Reviewed, and includes cad in [place] mother and father    Medication List   Post Discharge Medication Reconciliation Status: Post Discharge Medication Reconciliation Status: discharge medications reconciled, continue medications without change.  Current Outpatient Medications   Medication     acetaminophen (TYLENOL) 500 MG tablet     acetaminophen (TYLENOL) 650 MG CR tablet     apixaban ANTICOAGULANT (ELIQUIS) 2.5 MG tablet     atorvastatin (LIPITOR) 40 MG tablet     calcium carbonate-vitamin D (OSCAL W/D) 500-200 MG-UNIT tablet     clopidogrel (PLAVIX) 75 MG tablet     diclofenac (VOLTAREN) 1 % topical gel     ezetimibe (ZETIA) 10 MG tablet     fenoprofen calcium 600 MG TABS tablet     hydrochlorothiazide (HYDRODIURIL) 12.5 MG tablet     lidocaine (LIDODERM) 5 % patch     lisinopril (ZESTRIL) 30 MG tablet     methocarbamol (ROBAXIN) 750 MG tablet     metoprolol tartrate (LOPRESSOR) 25 MG tablet     nitroGLYcerin (NITROSTAT) 0.4 MG sublingual tablet     ondansetron (ZOFRAN-ODT) 4 MG ODT tab     polyethylene glycol (MIRALAX) 17 g packet     raloxifene (EVISTA) 60 MG tablet     senna-docusate (SENOKOT-S/PERICOLACE) 8.6-50 MG tablet     simethicone (MYLICON) 80 MG chewable tablet     traMADol (ULTRAM) 50 MG tablet     Vitamin D3 (CHOLECALCIFEROL) 125 MCG (5000  "UT) tablet     Vitamin D3 (CHOLECALCIFEROL) 25 mcg (1000 units) tablet     No current facility-administered medications for this visit.          Allergies     Allergies   Allergen Reactions     Penicillins Rash       Review of Systems   A comprehensive review of 14 systems was done. Pertinent findings noted here and in history of present illness. All the rest negative.  Constitutional: Negative.  Negative for fever, chills, she has  activity change, appetite change and fatigue.   BP are very low and pt states that is normal  HENT: Negative for congestion and facial swelling.    Eyes: Negative for photophobia, redness and visual disturbance.   Respiratory: Negative for cough and chest tightness.    Cardiovascular: Negative for chest pain, palpitations and leg swelling.   Gastrointestinal: Negative for nausea, diarrhea, constipation, blood in stool and abdominal distention.   Genitourinary: Negative.    Musculoskeletal: Negative.  Denies any pain at rest and not using any tramadol  Skin: Negative.    Neurological: Negative for dizziness, tremors, syncope, weakness, light-headedness and headaches.   Hematological: Does not bruise/bleed easily.   Psychiatric/Behavioral: Negative.        Physical Exam   /68   Pulse 66   Temp 97  F (36.1  C)   Resp 20   Ht 1.626 m (5' 4\")   Wt 47.2 kg (104 lb)   SpO2 91%   BMI 17.85 kg/m       Constitutional: Oriented to person, place, and time and appears well-developed.   HEENT:  Normocephalic and atraumatic.  Eyes: Conjunctivae and EOM are normal. Pupils are equal, round, and reactive to light. No discharge.  No scleral icterus. Nose normal. Mouth/Throat: Oropharynx is clear and moist. No oropharyngeal exudate.    NECK: Normal range of motion. Neck supple. No JVD present. No tracheal deviation present. No thyromegaly present.   CARDIOVASCULAR: Normal rate, regular rhythm and intact distal pulses.  Exam reveals no gallop and no friction rub.  Systolic murmur " present.  PULMONARY: Effort normal and breath sounds normal. No respiratory distress.No Wheezing or rales.  ABDOMEN: Soft. Bowel sounds are normal. No distension and no mass.  There is no tenderness. There is no rebound and no guarding. No HSM.  MUSCULOSKELETAL: Normal range of motion. No edema and no tenderness. Mild kyphosis, no tenderness.  Has a tlso brace  LYMPH NODES: Has no cervical, supraclavicular, axillary and groin adenopathy.   NEUROLOGICAL: Alert and oriented to person, place, and time. No cranial nerve deficit.  Normal muscle tone. Coordination normal.   GENITOURINARY: Deferred exam.  SKIN: Skin is warm and dry. No rash noted. No erythema. No pallor.   EXTREMITIES: No cyanosis, no clubbing, no edema. No Deformity.  PSYCHIATRIC: Normal mood, affect and behavior.      Lab Results     Recent Results (from the past 240 hour(s))   Basic metabolic panel    Collection Time: 02/01/22  6:59 AM   Result Value Ref Range    Sodium 132 (L) 136 - 145 mmol/L    Potassium 3.0 (L) 3.5 - 5.0 mmol/L    Chloride 95 (L) 98 - 107 mmol/L    Carbon Dioxide (CO2) 28 22 - 31 mmol/L    Anion Gap 9 5 - 18 mmol/L    Urea Nitrogen 23 8 - 28 mg/dL    Creatinine 0.67 0.60 - 1.10 mg/dL    Calcium 9.3 8.5 - 10.5 mg/dL    Glucose 85 70 - 125 mg/dL    GFR Estimate 86 >60 mL/min/1.73m2   CBC with platelets    Collection Time: 02/01/22  6:59 AM   Result Value Ref Range    WBC Count 3.1 (L) 4.0 - 11.0 10e3/uL    RBC Count 3.70 (L) 3.80 - 5.20 10e6/uL    Hemoglobin 12.7 11.7 - 15.7 g/dL    Hematocrit 36.9 35.0 - 47.0 %     78 - 100 fL    MCH 34.3 (H) 26.5 - 33.0 pg    MCHC 34.4 31.5 - 36.5 g/dL    RDW 12.6 10.0 - 15.0 %    Platelet Count 245 150 - 450 10e3/uL   Magnesium    Collection Time: 02/01/22  6:59 AM   Result Value Ref Range    Magnesium 1.5 (L) 1.8 - 2.6 mg/dL             Electronically signed by    Flaca Solano MD

## 2022-02-01 NOTE — LETTER
2/1/2022        RE: Mary Mederos   [address]  [facility], [place] [address]        [facility] GERIATRIC SERVICES       Patient Mary Mederos  MRN: [id number]        Reason for Visit     Chief Complaint   Patient presents with     RECHECK     Initial       Code Status     CPR/Full code     Assessment     Fall  Sacral fracture  Compression fracture T7  HTN WITH hypotension  Electrolyte imbalance with low potassium and magnesium  Generalized weakness  leukopenia white count is 3.1  osteopenia on multiple doses of calcium supplementation    Plan     Pt is admitted to TCU for strengthening and rehab.  Given conservative treatment  Cont tlso  WBAT  Pain controlled optimized and pt is on scheduled tylenol  Continue with same  Using tramadol sparingly  Follow-up with ortho scheduled in [place] - pt reports she pans to shift care to [place]  Advised to make appt for next week  BP review done and she is on multiple antihypertensives  Admission Bp was 76/36  Today prior to meds she has low BP  Advised to hold all antihypertensives  Hold parameters given for lisinopril  Check orthoststics  Monitor trends and reduce medication if Bp remain low  Advised fluids  R/c labs show low K -critical level of 3  Give K 60 meq x 2 today  Start K 20 meq daily  R/c K in 1-2 d  laura 400 mg daily   R/c levels in 1 -2 weeks  medication review done and she is on multiple calcium and vitamin D supplementations.  I'm not sure why she is taking 3 separate doses of vitamin D and calcium.  We'll discontinue all these orders and keep her on calcium daily along with vitamin D 2000 units daily.  Cont evista  also noted to have  White count of 3.1.  She does not appear to be septic.  She is afebrile.  Would like to repeat her CBC in 1 week's time and get a peripheral smear also.  Recheck labs  Continue with PT/OT-appears to be quite independent living by herself and not using any assist device in the past hopeful to get back to her prior level of  functioning    History     Patient is a very pleasant 81 year old female who is admitted to TCU  She admitted post fall does not recall how  She was admitted in hospital with sacral fracture  This will be treated conservatively  She was noted to have a T7 compression fracture  Saw ortho has been fitted with TLSO  Pain was optimized and she reports good pain control.she is not using much tramadol  BP remain very low and she is on 3 antihypertensives  Reports low BP at home.    Chief Complaint   Patient presents with     RECHECK     Initial       Past Medical & Surgical History     HTN  ASHHD  HLD    Past Social History     Reviewed,  has an unknown smoking status. She has never used smokeless tobacco.   Does have wine occasionaly    Family History     Reviewed, and includes cad in [place] mother and father    Medication List   Post Discharge Medication Reconciliation Status: Post Discharge Medication Reconciliation Status: discharge medications reconciled, continue medications without change.  Current Outpatient Medications   Medication     acetaminophen (TYLENOL) 500 MG tablet     acetaminophen (TYLENOL) 650 MG CR tablet     apixaban ANTICOAGULANT (ELIQUIS) 2.5 MG tablet     atorvastatin (LIPITOR) 40 MG tablet     calcium carbonate-vitamin D (OSCAL W/D) 500-200 MG-UNIT tablet     clopidogrel (PLAVIX) 75 MG tablet     diclofenac (VOLTAREN) 1 % topical gel     ezetimibe (ZETIA) 10 MG tablet     fenoprofen calcium 600 MG TABS tablet     hydrochlorothiazide (HYDRODIURIL) 12.5 MG tablet     lidocaine (LIDODERM) 5 % patch     lisinopril (ZESTRIL) 30 MG tablet     methocarbamol (ROBAXIN) 750 MG tablet     metoprolol tartrate (LOPRESSOR) 25 MG tablet     nitroGLYcerin (NITROSTAT) 0.4 MG sublingual tablet     ondansetron (ZOFRAN-ODT) 4 MG ODT tab     polyethylene glycol (MIRALAX) 17 g packet     raloxifene (EVISTA) 60 MG tablet     senna-docusate (SENOKOT-S/PERICOLACE) 8.6-50 MG tablet     simethicone (MYLICON) 80 MG chewable  "tablet     traMADol (ULTRAM) 50 MG tablet     Vitamin D3 (CHOLECALCIFEROL) 125 MCG (5000 UT) tablet     Vitamin D3 (CHOLECALCIFEROL) 25 mcg (1000 units) tablet     No current facility-administered medications for this visit.          Allergies     Allergies   Allergen Reactions     Penicillins Rash       Review of Systems   A comprehensive review of 14 systems was done. Pertinent findings noted here and in history of present illness. All the rest negative.  Constitutional: Negative.  Negative for fever, chills, she has  activity change, appetite change and fatigue.   BP are very low and pt states that is normal  HENT: Negative for congestion and facial swelling.    Eyes: Negative for photophobia, redness and visual disturbance.   Respiratory: Negative for cough and chest tightness.    Cardiovascular: Negative for chest pain, palpitations and leg swelling.   Gastrointestinal: Negative for nausea, diarrhea, constipation, blood in stool and abdominal distention.   Genitourinary: Negative.    Musculoskeletal: Negative.  Denies any pain at rest and not using any tramadol  Skin: Negative.    Neurological: Negative for dizziness, tremors, syncope, weakness, light-headedness and headaches.   Hematological: Does not bruise/bleed easily.   Psychiatric/Behavioral: Negative.        Physical Exam   /68   Pulse 66   Temp 97  F (36.1  C)   Resp 20   Ht 1.626 m (5' 4\")   Wt 47.2 kg (104 lb)   SpO2 91%   BMI 17.85 kg/m       Constitutional: Oriented to person, place, and time and appears well-developed.   HEENT:  Normocephalic and atraumatic.  Eyes: Conjunctivae and EOM are normal. Pupils are equal, round, and reactive to light. No discharge.  No scleral icterus. Nose normal. Mouth/Throat: Oropharynx is clear and moist. No oropharyngeal exudate.    NECK: Normal range of motion. Neck supple. No JVD present. No tracheal deviation present. No thyromegaly present.   CARDIOVASCULAR: Normal rate, regular rhythm and intact " distal pulses.  Exam reveals no gallop and no friction rub.  Systolic murmur present.  PULMONARY: Effort normal and breath sounds normal. No respiratory distress.No Wheezing or rales.  ABDOMEN: Soft. Bowel sounds are normal. No distension and no mass.  There is no tenderness. There is no rebound and no guarding. No HSM.  MUSCULOSKELETAL: Normal range of motion. No edema and no tenderness. Mild kyphosis, no tenderness.  Has a tlso brace  LYMPH NODES: Has no cervical, supraclavicular, axillary and groin adenopathy.   NEUROLOGICAL: Alert and oriented to person, place, and time. No cranial nerve deficit.  Normal muscle tone. Coordination normal.   GENITOURINARY: Deferred exam.  SKIN: Skin is warm and dry. No rash noted. No erythema. No pallor.   EXTREMITIES: No cyanosis, no clubbing, no edema. No Deformity.  PSYCHIATRIC: Normal mood, affect and behavior.      Lab Results     Recent Results (from the past 240 hour(s))   Basic metabolic panel    Collection Time: 02/01/22  6:59 AM   Result Value Ref Range    Sodium 132 (L) 136 - 145 mmol/L    Potassium 3.0 (L) 3.5 - 5.0 mmol/L    Chloride 95 (L) 98 - 107 mmol/L    Carbon Dioxide (CO2) 28 22 - 31 mmol/L    Anion Gap 9 5 - 18 mmol/L    Urea Nitrogen 23 8 - 28 mg/dL    Creatinine 0.67 0.60 - 1.10 mg/dL    Calcium 9.3 8.5 - 10.5 mg/dL    Glucose 85 70 - 125 mg/dL    GFR Estimate 86 >60 mL/min/1.73m2   CBC with platelets    Collection Time: 02/01/22  6:59 AM   Result Value Ref Range    WBC Count 3.1 (L) 4.0 - 11.0 10e3/uL    RBC Count 3.70 (L) 3.80 - 5.20 10e6/uL    Hemoglobin 12.7 11.7 - 15.7 g/dL    Hematocrit 36.9 35.0 - 47.0 %     78 - 100 fL    MCH 34.3 (H) 26.5 - 33.0 pg    MCHC 34.4 31.5 - 36.5 g/dL    RDW 12.6 10.0 - 15.0 %    Platelet Count 245 150 - 450 10e3/uL   Magnesium    Collection Time: 02/01/22  6:59 AM   Result Value Ref Range    Magnesium 1.5 (L) 1.8 - 2.6 mg/dL             Electronically signed by    Flaca Solano MD                              Sincerely,        EDILMA Shirley

## 2022-02-01 NOTE — PROGRESS NOTES
[facility] GERIATRIC SERVICES  Chief Complaint   Patient presents with     [facility] F/U     [place] Medical Record Number:  [id number]  Place of Service where encounter took place:  [facility] ([facility]) [[id number]]  Code Status:  No Order     HISTORY:      HPI:  Mary Mederos  is 81 year old (1940) undergoing physical and occupational therapy.   She is with past medical history hypertension, hyperlipidemia, atrial fibrillation currently on Eliquis, Per hospital records she was brought to the  ED by ambulance after she fell down 5-10 stairs at her home. Patient's variable alarm went off and alerted EMS. When EMS got to the scene her family that were living next door had lifted her off the floor and she was sitting in a chair.  On arrival in the ED patient was awake and alert. She denied any headache. C-collar was in place. She did complain of pain in both her upper back and lower back. Pain was worse with movement. She denies any shortness of breath. She states that she felt that she had stumbled. She denied any lightheadedness chest pain or palpitations prior to the fall. She was found to have a T7 fracture.    Today she is seen to review vital signs, labs, pain management, and to establish care. She denied chest pain shortness of breath cough congestion constipation or diarrhea. She did have an elevated pulse of 135 however she is now rhythm controlled. Patient tells writer that she was cardioverted in the hospital. She was refusing her calcium and vitamin D tablets. Writer spoke with her regarding the importance of taking them due to her fractures and it appeared it was because the tablets were too large. She will attempt to take them cut in half. She does rate her pain 2 out of 10 and pain controlled with current medications. She did have intermittent low blood pressures and denied dizziness. Labs pending. TLSO when OOB.      ALLERGIES:Penicillins    PAST MEDICAL HISTORY: History reviewed. No  "pertinent past medical history.    PAST SURGICAL HISTORY:   has no past surgical history on file.    FAMILY HISTORY: family history is not on file.    SOCIAL HISTORY:  has an unknown smoking status. She has never used smokeless tobacco.    ROS:  Constitutional: Negative for activity change, appetite change, fatigue and fever.   HENT: Negative for congestion.    Respiratory: Negative for cough, shortness of breath and wheezing.    Cardiovascular: Negative for chest pain and leg swelling. Atrial fibrillation   Gastrointestinal: Negative for abdominal distention, abdominal pain, constipation, diarrhea and nausea.   Genitourinary: Negative for dysuria.   Musculoskeletal: Negative for arthralgia.positive for back pain.   Skin: Negative for color change and wound.   Neurological: Negative for dizziness.   Psychiatric/Behavioral: Negative for agitation, behavioral problems and confusion.     Physical Exam:  Constitutional:       Appearance: Patient is well-developed.   HENT:      Head: Normocephalic.   Eyes:      Conjunctiva/sclera: Conjunctivae normal.   Neck:      Musculoskeletal: Normal range of motion.   Cardiovascular:      Rate and Rhythm: Normal rate and regular rhythm.      Heart sounds: Normal heart sounds. No murmur.   Pulmonary:      Effort: No respiratory distress.      Breath sounds: Normal breath sounds. No wheezing or rales.   Abdominal:      General: Bowel sounds are normal. There is no distension.      Palpations: Abdomen is soft.      Tenderness: There is no abdominal tenderness.   Musculoskeletal:       Normal range of motion.     Skin:General:        Skin is warm.   Neurological:         Mental Status: Patient is alert and oriented to person, place, and time.   Psychiatric:         Behavior: Behavior normal.     Vitals:BP 99/51   Pulse (!) 135   Temp 96.8  F (36  C)   Resp 20   Ht 1.626 m (5' 4\")   Wt 46.3 kg (102 lb)   SpO2 98%   BMI 17.51 kg/m   and Body mass index is 17.51 " kg/m .    Lab/Diagnostic data:   Recent Results (from the past 240 hour(s))   Basic metabolic panel    Collection Time: 02/01/22  6:59 AM   Result Value Ref Range    Sodium 132 (L) 136 - 145 mmol/L    Potassium 3.0 (L) 3.5 - 5.0 mmol/L    Chloride 95 (L) 98 - 107 mmol/L    Carbon Dioxide (CO2) 28 22 - 31 mmol/L    Anion Gap 9 5 - 18 mmol/L    Urea Nitrogen 23 8 - 28 mg/dL    Creatinine 0.67 0.60 - 1.10 mg/dL    Calcium 9.3 8.5 - 10.5 mg/dL    Glucose 85 70 - 125 mg/dL    GFR Estimate 86 >60 mL/min/1.73m2   CBC with platelets    Collection Time: 02/01/22  6:59 AM   Result Value Ref Range    WBC Count 3.1 (L) 4.0 - 11.0 10e3/uL    RBC Count 3.70 (L) 3.80 - 5.20 10e6/uL    Hemoglobin 12.7 11.7 - 15.7 g/dL    Hematocrit 36.9 35.0 - 47.0 %     78 - 100 fL    MCH 34.3 (H) 26.5 - 33.0 pg    MCHC 34.4 31.5 - 36.5 g/dL    RDW 12.6 10.0 - 15.0 %    Platelet Count 245 150 - 450 10e3/uL   Magnesium    Collection Time: 02/01/22  6:59 AM   Result Value Ref Range    Magnesium 1.5 (L) 1.8 - 2.6 mg/dL       MEDICATIONS:     Review of your medicines          Accurate as of January 31, 2022 11:59 PM. If you have any questions, ask your nurse or doctor.            CONTINUE these medicines which have NOT CHANGED      Dose / Directions   * acetaminophen 650 MG CR tablet  Commonly known as: TYLENOL      Dose: 650 mg  Take 650 mg by mouth every 24 hours  Refills: 0     * acetaminophen 500 MG tablet  Commonly known as: TYLENOL      Dose: 1,000 mg  Take 1,000 mg by mouth 3 times daily  Refills: 0     apixaban ANTICOAGULANT 2.5 MG tablet  Commonly known as: ELIQUIS      Dose: 2.5 mg  Take 2.5 mg by mouth 2 times daily  Refills: 0     atorvastatin 40 MG tablet  Commonly known as: LIPITOR      Dose: 40 mg  Take 40 mg by mouth daily  Refills: 0     calcium carbonate-vitamin D 500-200 MG-UNIT tablet  Commonly known as: OSCAL w/D      Dose: 1 tablet  Take 1 tablet by mouth daily  Refills: 0     clopidogrel 75 MG tablet  Commonly known as:  PLAVIX      Dose: 75 mg  Take 75 mg by mouth daily  Refills: 0     diclofenac 1 % topical gel  Commonly known as: VOLTAREN      Apply topically every 12 hours  Refills: 0     ezetimibe 10 MG tablet  Commonly known as: ZETIA      Dose: 10 mg  Take 10 mg by mouth daily  Refills: 0     fenoprofen calcium 600 MG Tabs tablet      Dose: 600 mg  Take 600 mg by mouth daily  Refills: 0     hydrochlorothiazide 12.5 MG tablet  Commonly known as: HYDRODIURIL      Dose: 50 mg  Take 50 mg by mouth daily  Refills: 0     lidocaine 5 % patch  Commonly known as: LIDODERM      Dose: 1 patch  Place 1 patch onto the skin every 24 hours To prevent lidocaine toxicity, patient should be patch free for 12 hrs daily.  Refills: 0     lisinopril 30 MG tablet  Commonly known as: ZESTRIL      Dose: 30 mg  Take 30 mg by mouth daily  Refills: 0     methocarbamol 750 MG tablet  Commonly known as: ROBAXIN      Dose: 750 mg  Take 750 mg by mouth every 6 hours as needed for muscle spasms  Refills: 0     metoprolol tartrate 25 MG tablet  Commonly known as: LOPRESSOR      Dose: 25 mg  Take 25 mg by mouth 2 times daily  Refills: 0     nitroGLYcerin 0.4 MG sublingual tablet  Commonly known as: NITROSTAT      Dose: 0.4 mg  Place 0.4 mg under the tongue every 5 minutes as needed for chest pain For chest pain place 1 tablet under the tongue every 5 minutes for 3 doses. If symptoms persist 5 minutes after 1st dose call 911.  Refills: 0     ondansetron 4 MG ODT tab  Commonly known as: ZOFRAN-ODT      Dose: 4 mg  Take 4 mg by mouth every 6 hours as needed for nausea  Refills: 0     polyethylene glycol 17 g packet  Commonly known as: MIRALAX      Dose: 1 packet  Take 1 packet by mouth daily  Refills: 0     raloxifene 60 MG tablet  Commonly known as: EVISTA      Dose: 60 mg  Take 60 mg by mouth daily  Refills: 0     senna-docusate 8.6-50 MG tablet  Commonly known as: SENOKOT-S/PERICOLACE      Dose: 1 tablet  Take 1 tablet by mouth 2 times daily as needed for  constipation  Refills: 0     simethicone 80 MG chewable tablet  Commonly known as: MYLICON      Dose: 160 mg  Take 160 mg by mouth 2 times daily  Refills: 0     traMADol 50 MG tablet  Commonly known as: ULTRAM      Dose: 50 mg  Take 50 mg by mouth every 4 hours as needed for severe pain  Refills: 0     * Vitamin D3 125 MCG (5000 UT) tablet  Commonly known as: CHOLECALCIFEROL      Dose: 125 mcg  Take 125 mcg by mouth daily  Refills: 0     * Vitamin D3 25 mcg (1000 units) tablet  Commonly known as: CHOLECALCIFEROL      Dose: 25 mcg  Take 25 mcg by mouth daily  Refills: 0         * This list has 4 medication(s) that are the same as other medications prescribed for you. Read the directions carefully, and ask your doctor or other care provider to review them with you.                ASSESSMENT/PLAN  Encounter Diagnoses   Name Primary?     Osteoporosis, unspecified osteoporosis type, unspecified pathological fracture presence Yes     Pain management      Physical deconditioning      Osteoporosis patient with pathological T7 fracture. Continue calcium carbonate, calcium citrate, vitamin D, raloxifene    Pain management tramadol 50 mg every 4 hours as needed, lidocaine patch as scheduled, diclofenac gel as needed, extended release and extra strength Tylenol    Physical deconditioning PT OT    Atrial fibrillation with Toprol tartrate 25 mg twice daily, Eliquis 2.5 mg twice daily    HDL continue atorvastatin    Electronically signed by: Poornima Messina CNP

## 2022-02-01 NOTE — TELEPHONE ENCOUNTER
[facility] Geriatrics Triage Nurse Telephone Encounter    Provider: Flaca Solano MD  Facility: [facility]  Facility Type:  TCU    Caller: Daisha  Call Back Number: [phone]    Allergies:    Allergies   Allergen Reactions     Penicillins Rash        Reason for call: Nurse calling to report Heme 2, BMP, and Mg levels.  Notable meds:  Tramadol 50mg Q 4 hours PRN, Raloxifene 60mg daily, calcium/D 500/200 daily, calcium citrate 600mg daily, Eliquis 2.5mg BID, Vitamin D 5000 units daily and 1000 units daily, Plavix 75mg daily, Metoprolol 25mg BID, Hydrochlorothiazide 50mg daily, Lisinopril 30mg daily.      Verbal Order/Direction given by Provider: Magnesium oxide 400mg daily.  Check Mg level on 2/15/22.  Potassium 60meq x 1 dose now and then repeat 60meq x 1 dose 4 hours later.  Check potassium level on 2/2/22.  Hold Lisinopril for SBP <110.  Check Heme 1 and peripheral smear on 2/8/22.  Discontinue calcium/D 500/200.  Change Vitamin D to 2000 units.      Provider giving Order:  Flaca Solano MD    Verbal Order given to: Daisha Muñoz, RN

## 2022-02-02 NOTE — TELEPHONE ENCOUNTER
[facility] Geriatrics Triage Nurse Telephone Encounter    Provider: Flaca Solano MD  Facility: [facility] Type:  TCU    Caller: Lu  Call Back Number: [phone]    Allergies:    Allergies   Allergen Reactions     Penicillins Rash        Reason for call: Nurse is calling to report that patient's potassium level was not drawn today.  Patient received potassium 60meq x 2 doses yesterday.      Verbal Order/Direction given by Provider: Give potassium 20meq x 1 now.  Check potassium level on 2/3/22.      Provider giving Order:  Flaca Solano MD    Verbal Order given to: Lu Muñoz RN

## 2022-02-03 NOTE — LETTER
2/3/2022        RE: Mary Mederos   [facility] [id number]        [facility] GERIATRIC SERVICES       Patient Mary Mederos  MRN: [id number]        Reason for Visit     Chief Complaint   Patient presents with     RECHECK     Follow-up pain /low BP  Code Status     CPR/Full code     Assessment     Fall  Sacral fracture  Compression fracture T7  HTN WITH hypotension.  Hyperkalemia -K 5.4  Electrolyte imbalance with low potassium and magnesium  Generalized weakness  leukopenia white count is 3.1  osteopenia on multiple doses of calcium supplementation    Plan     Pt is admitted to TCU for strengthening and rehab.  Given conservative treatment  Cont tlso  WBAT  Pain is still an issue and she continues with her scheduled Tylenol.  Advised to try some tramadol  If no improvement she needs to follow-up with orthopedics in [facility]  She does not plan to see her orthopedic follow-up appointment in [place]  Blood pressure review done and continues to be on the low side since admission  Discontinue lisinopril 30 mg  Start lisinopril 10 mg with hold parameters  Continue to monitor trends.  Check orthostatics  Recheck labs reviewed.  Her potassium was 5.4.  She was low and was given potassium supplementation unfortunately due to nursing issues she was given her multiple Dycem supplementation together last night  We will discontinue her potassium 20 mEq daily  Recheck potassium again  Cont pt        History     Patient is a very pleasant 81 year old female who is admitted to TCU  She admitted post fall does not recall how  She was admitted in hospital with sacral fracture  This will be treated conservatively  She was noted to have a T7 compression fracture  Saw ortho has been fitted with TLSO  Pain was optimized   She is on scheduled Tylenol in the TCU but now reporting that her pain is worse.  She is bedbound today  BP remain very low and she is on 3 antihypertensives  Reports low BP at home.  Since  admission we have been holding her lisinopril based on parameters and blood pressures have continued to be on the low side  Denies any discomfort but is not ambulating very well without any issues    Chief Complaint   Patient presents with     RECHECK       Past Medical & Surgical History     HTN  [facility]  HLD    Past Social History     Reviewed,  has an unknown smoking status. She has never used smokeless tobacco.   Does have wine occasionaly    Family History     Reviewed, and includes cad in [place] mother and father    Medication List   Post Discharge Medication Reconciliation Status: Post Discharge Medication Reconciliation Status: discharge medications reconciled, continue medications without change.  Current Outpatient Medications   Medication     acetaminophen (TYLENOL) 500 MG tablet     acetaminophen (TYLENOL) 650 MG CR tablet     apixaban ANTICOAGULANT (ELIQUIS) 2.5 MG tablet     atorvastatin (LIPITOR) 40 MG tablet     CALCIUM CITRATE PO     clopidogrel (PLAVIX) 75 MG tablet     diclofenac (VOLTAREN) 1 % topical gel     ezetimibe (ZETIA) 10 MG tablet     fenoprofen calcium 600 MG TABS tablet     hydrochlorothiazide (HYDRODIURIL) 12.5 MG tablet     lidocaine (LIDODERM) 5 % patch     lisinopril (ZESTRIL) 30 MG tablet     magnesium oxide (MAG-OX) 400 MG tablet     methocarbamol (ROBAXIN) 750 MG tablet     metoprolol tartrate (LOPRESSOR) 25 MG tablet     nitroGLYcerin (NITROSTAT) 0.4 MG sublingual tablet     ondansetron (ZOFRAN-ODT) 4 MG ODT tab     polyethylene glycol (MIRALAX) 17 g packet     raloxifene (EVISTA) 60 MG tablet     senna-docusate (SENOKOT-S/PERICOLACE) 8.6-50 MG tablet     simethicone (MYLICON) 80 MG chewable tablet     traMADol (ULTRAM) 50 MG tablet     Vitamin D3 (CHOLECALCIFEROL) 25 mcg (1000 units) tablet     No current facility-administered medications for this visit.          Allergies     Allergies   Allergen Reactions     Penicillins Rash       Review of Systems   A comprehensive review of  "14 systems was done. Pertinent findings noted here and in history of present illness. All the rest negative.  Constitutional: Negative.  Negative for fever, chills, she has  activity change, appetite change and fatigue.   BP are very low and pt states that is normal  HENT: Negative for congestion and facial swelling.    Eyes: Negative for photophobia, redness and visual disturbance.   Respiratory: Negative for cough and chest tightness.    Cardiovascular: Negative for chest pain, palpitations and leg swelling.   Gastrointestinal: Negative for nausea, diarrhea, constipation, blood in stool and abdominal distention.   Genitourinary: Negative.    Musculoskeletal: Negative. Having significant  pain at rest and not using any tramadol  Skin: Negative.    Neurological: Negative for dizziness, tremors, syncope, weakness, light-headedness and headaches.   Hematological: Does not bruise/bleed easily.   Psychiatric/Behavioral: Negative.        Physical Exam   /78   Pulse 88   Temp 98.3  F (36.8  C)   Resp 18   Ht 1.626 m (5' 4\")   Wt 47.6 kg (105 lb)   SpO2 94%   BMI 18.02 kg/m       Constitutional: Oriented to person, place, and time and appears well-developed.   HEENT:  Normocephalic and atraumatic.  Eyes: Conjunctivae and EOM are normal. Pupils are equal, round, and reactive to light. No discharge.  No scleral icterus. Nose normal. Mouth/Throat: Oropharynx is clear and moist. No oropharyngeal exudate.    NECK: Normal range of motion. Neck supple. No JVD present. No tracheal deviation present. No thyromegaly present.   CARDIOVASCULAR: Normal rate, regular rhythm and intact distal pulses.  Exam reveals no gallop and no friction rub.  Systolic murmur present.  PULMONARY: Effort normal and breath sounds normal. No respiratory distress.No Wheezing or rales.  ABDOMEN: Soft. Bowel sounds are normal. No distension and no mass.  There is no tenderness. There is no rebound and no guarding. No HSM.  MUSCULOSKELETAL: " Normal range of motion. No edema and no tenderness. Mild kyphosis, thorasic spine tenderness.  Has a tlso brace  LYMPH NODES: Has no cervical, supraclavicular, axillary and groin adenopathy.   NEUROLOGICAL: Alert and oriented to person, place, and time. No cranial nerve deficit.  Normal muscle tone. Coordination normal.   GENITOURINARY: Deferred exam.  SKIN: Skin is warm and dry. No rash noted. No erythema. No pallor.   EXTREMITIES: No cyanosis, no clubbing, no edema. No Deformity.  PSYCHIATRIC: Normal mood, affect and behavior.      Lab Results     Recent Results (from the past 240 hour(s))   Basic metabolic panel    Collection Time: 02/01/22  6:59 AM   Result Value Ref Range    Sodium 132 (L) 136 - 145 mmol/L    Potassium 3.0 (L) 3.5 - 5.0 mmol/L    Chloride 95 (L) 98 - 107 mmol/L    Carbon Dioxide (CO2) 28 22 - 31 mmol/L    Anion Gap 9 5 - 18 mmol/L    Urea Nitrogen 23 8 - 28 mg/dL    Creatinine 0.67 0.60 - 1.10 mg/dL    Calcium 9.3 8.5 - 10.5 mg/dL    Glucose 85 70 - 125 mg/dL    GFR Estimate 86 >60 mL/min/1.73m2   CBC with platelets    Collection Time: 02/01/22  6:59 AM   Result Value Ref Range    WBC Count 3.1 (L) 4.0 - 11.0 10e3/uL    RBC Count 3.70 (L) 3.80 - 5.20 10e6/uL    Hemoglobin 12.7 11.7 - 15.7 g/dL    Hematocrit 36.9 35.0 - 47.0 %     78 - 100 fL    MCH 34.3 (H) 26.5 - 33.0 pg    MCHC 34.4 31.5 - 36.5 g/dL    RDW 12.6 10.0 - 15.0 %    Platelet Count 245 150 - 450 10e3/uL   Magnesium    Collection Time: 02/01/22  6:59 AM   Result Value Ref Range    Magnesium 1.5 (L) 1.8 - 2.6 mg/dL   Potassium    Collection Time: 02/03/22  6:50 AM   Result Value Ref Range    Potassium 5.4 (H) 3.5 - 5.0 mmol/L           Electronically signed by    Flaca Solano MD                             Sincerely,        EDILMA Shirley

## 2022-02-03 NOTE — PROGRESS NOTES
[facility] GERIATRIC SERVICES       Patient Mary Mederos  MRN: [id number]        Reason for Visit     Chief Complaint   Patient presents with     RECHECK     Follow-up pain /low BP  Code Status     CPR/Full code     Assessment     Fall  Sacral fracture  Compression fracture T7  HTN WITH hypotension.  Hyperkalemia -K 5.4  Electrolyte imbalance with low potassium and magnesium  Generalized weakness  leukopenia white count is 3.1  osteopenia on multiple doses of calcium supplementation    Plan     Pt is admitted to TCU for strengthening and rehab.  Given conservative treatment  Cont tlso  WBAT  Pain is still an issue and she continues with her scheduled Tylenol.  Advised to try some tramadol  If no improvement she needs to follow-up with orthopedics in [facility]  She does not plan to see her orthopedic follow-up appointment in [place]  Blood pressure review done and continues to be on the low side since admission  Discontinue lisinopril 30 mg  Start lisinopril 10 mg with hold parameters  Continue to monitor trends.  Check orthostatics  Recheck labs reviewed.  Her potassium was 5.4.  She was low and was given potassium supplementation unfortunately due to nursing issues she was given her multiple Dycem supplementation together last night  We will discontinue her potassium 20 mEq daily  Recheck potassium again  Cont pt        History     Patient is a very pleasant 81 year old female who is admitted to TCU  She admitted post fall does not recall how  She was admitted in hospital with sacral fracture  This will be treated conservatively  She was noted to have a T7 compression fracture  Saw ortho has been fitted with TLSO  Pain was optimized   She is on scheduled Tylenol in the TCU but now reporting that her pain is worse.  She is bedbound today  BP remain very low and she is on 3 antihypertensives  Reports low BP at home.  Since admission we have been holding her lisinopril based on parameters and blood pressures have  continued to be on the low side  Denies any discomfort but is not ambulating very well without any issues    Chief Complaint   Patient presents with     RECHECK       Past Medical & Surgical History     HTN  [facility]  HLD    Past Social History     Reviewed,  has an unknown smoking status. She has never used smokeless tobacco.   Does have wine occasionaly    Family History     Reviewed, and includes cad in [place] mother and father    Medication List   Post Discharge Medication Reconciliation Status: Post Discharge Medication Reconciliation Status: discharge medications reconciled, continue medications without change.  Current Outpatient Medications   Medication     acetaminophen (TYLENOL) 500 MG tablet     acetaminophen (TYLENOL) 650 MG CR tablet     apixaban ANTICOAGULANT (ELIQUIS) 2.5 MG tablet     atorvastatin (LIPITOR) 40 MG tablet     CALCIUM CITRATE PO     clopidogrel (PLAVIX) 75 MG tablet     diclofenac (VOLTAREN) 1 % topical gel     ezetimibe (ZETIA) 10 MG tablet     fenoprofen calcium 600 MG TABS tablet     hydrochlorothiazide (HYDRODIURIL) 12.5 MG tablet     lidocaine (LIDODERM) 5 % patch     lisinopril (ZESTRIL) 30 MG tablet     magnesium oxide (MAG-OX) 400 MG tablet     methocarbamol (ROBAXIN) 750 MG tablet     metoprolol tartrate (LOPRESSOR) 25 MG tablet     nitroGLYcerin (NITROSTAT) 0.4 MG sublingual tablet     ondansetron (ZOFRAN-ODT) 4 MG ODT tab     polyethylene glycol (MIRALAX) 17 g packet     raloxifene (EVISTA) 60 MG tablet     senna-docusate (SENOKOT-S/PERICOLACE) 8.6-50 MG tablet     simethicone (MYLICON) 80 MG chewable tablet     traMADol (ULTRAM) 50 MG tablet     Vitamin D3 (CHOLECALCIFEROL) 25 mcg (1000 units) tablet     No current facility-administered medications for this visit.          Allergies     Allergies   Allergen Reactions     Penicillins Rash       Review of Systems   A comprehensive review of 14 systems was done. Pertinent findings noted here and in history of present illness. All  "the rest negative.  Constitutional: Negative.  Negative for fever, chills, she has  activity change, appetite change and fatigue.   BP are very low and pt states that is normal  HENT: Negative for congestion and facial swelling.    Eyes: Negative for photophobia, redness and visual disturbance.   Respiratory: Negative for cough and chest tightness.    Cardiovascular: Negative for chest pain, palpitations and leg swelling.   Gastrointestinal: Negative for nausea, diarrhea, constipation, blood in stool and abdominal distention.   Genitourinary: Negative.    Musculoskeletal: Negative. Having significant  pain at rest and not using any tramadol  Skin: Negative.    Neurological: Negative for dizziness, tremors, syncope, weakness, light-headedness and headaches.   Hematological: Does not bruise/bleed easily.   Psychiatric/Behavioral: Negative.        Physical Exam   /78   Pulse 88   Temp 98.3  F (36.8  C)   Resp 18   Ht 1.626 m (5' 4\")   Wt 47.6 kg (105 lb)   SpO2 94%   BMI 18.02 kg/m       Constitutional: Oriented to person, place, and time and appears well-developed.   HEENT:  Normocephalic and atraumatic.  Eyes: Conjunctivae and EOM are normal. Pupils are equal, round, and reactive to light. No discharge.  No scleral icterus. Nose normal. Mouth/Throat: Oropharynx is clear and moist. No oropharyngeal exudate.    NECK: Normal range of motion. Neck supple. No JVD present. No tracheal deviation present. No thyromegaly present.   CARDIOVASCULAR: Normal rate, regular rhythm and intact distal pulses.  Exam reveals no gallop and no friction rub.  Systolic murmur present.  PULMONARY: Effort normal and breath sounds normal. No respiratory distress.No Wheezing or rales.  ABDOMEN: Soft. Bowel sounds are normal. No distension and no mass.  There is no tenderness. There is no rebound and no guarding. No HSM.  MUSCULOSKELETAL: Normal range of motion. No edema and no tenderness. Mild kyphosis, thorasic spine " tenderness.  Has a tlso brace  LYMPH NODES: Has no cervical, supraclavicular, axillary and groin adenopathy.   NEUROLOGICAL: Alert and oriented to person, place, and time. No cranial nerve deficit.  Normal muscle tone. Coordination normal.   GENITOURINARY: Deferred exam.  SKIN: Skin is warm and dry. No rash noted. No erythema. No pallor.   EXTREMITIES: No cyanosis, no clubbing, no edema. No Deformity.  PSYCHIATRIC: Normal mood, affect and behavior.      Lab Results     Recent Results (from the past 240 hour(s))   Basic metabolic panel    Collection Time: 02/01/22  6:59 AM   Result Value Ref Range    Sodium 132 (L) 136 - 145 mmol/L    Potassium 3.0 (L) 3.5 - 5.0 mmol/L    Chloride 95 (L) 98 - 107 mmol/L    Carbon Dioxide (CO2) 28 22 - 31 mmol/L    Anion Gap 9 5 - 18 mmol/L    Urea Nitrogen 23 8 - 28 mg/dL    Creatinine 0.67 0.60 - 1.10 mg/dL    Calcium 9.3 8.5 - 10.5 mg/dL    Glucose 85 70 - 125 mg/dL    GFR Estimate 86 >60 mL/min/1.73m2   CBC with platelets    Collection Time: 02/01/22  6:59 AM   Result Value Ref Range    WBC Count 3.1 (L) 4.0 - 11.0 10e3/uL    RBC Count 3.70 (L) 3.80 - 5.20 10e6/uL    Hemoglobin 12.7 11.7 - 15.7 g/dL    Hematocrit 36.9 35.0 - 47.0 %     78 - 100 fL    MCH 34.3 (H) 26.5 - 33.0 pg    MCHC 34.4 31.5 - 36.5 g/dL    RDW 12.6 10.0 - 15.0 %    Platelet Count 245 150 - 450 10e3/uL   Magnesium    Collection Time: 02/01/22  6:59 AM   Result Value Ref Range    Magnesium 1.5 (L) 1.8 - 2.6 mg/dL   Potassium    Collection Time: 02/03/22  6:50 AM   Result Value Ref Range    Potassium 5.4 (H) 3.5 - 5.0 mmol/L           Electronically signed by    Flaca Solano MD

## 2022-02-04 NOTE — TELEPHONE ENCOUNTER
[facility] Geriatrics Triage Nurse Telephone Encounter    Provider: SANDY Montana  Facility: [facility]  Facility Type:  TCU    Caller: Lu  Call Back Number: [phone]    Allergies:    Allergies   Allergen Reactions     Penicillins Rash        Reason for call: Nurse is calling to report potassium level results.  Notable meds:  Hydrochlorothiazide 50mg daily, Lisinopril 10mg daily.      Verbal Order/Direction given by Provider: No new orders.      Provider giving Order:  SANDY Montana    Verbal Order given to: Lu Muñoz RN

## 2022-02-07 NOTE — PROGRESS NOTES
"[facility] GERIATRIC SERVICES  Chief Complaint   Patient presents with     SHIVAM     [facility] Record Number:  [id number]  Place of Service where encounter took place:  [facility] ([facility]) [[id number]]  Code Status:  No Order     HISTORY:      HPI:  Mary Mederos  is 81 year old (1940) undergoing physical and occupational therapy.   She is with past medical history hypertension, hyperlipidemia, atrial fibrillation currently on Eliquis, Per hospital records she was brought to the  ED by ambulance after she fell down 5-10 stairs at her home. Patient's variable alarm went off and alerted EMS. When EMS got to the scene her family that were living next door had lifted her off the floor and she was sitting in a chair.  On arrival in the ED patient was awake and alert. She denied any headache. C-collar was in place. She did complain of pain in both her upper back and lower back. Pain was worse with movement. She denies any shortness of breath. She states that she felt that she had stumbled. She denied any lightheadedness chest pain or palpitations prior to the fall. She was found to have a T7 fracture.    Today she is seen to review vital signs, labs, pain management, and for reports of chest pain.  She reported an ache across the top of her chest.  It appears it just came on this morning.  She denies any kind of pressure numbness tingling or radiating pain.  She reports this does not feel like my \"heart attack pain\".  She was given a dose of nitroglycerin and the pain completely subsided.  She denied shortness of breath cough congestion constipation or diarrhea.  TLSO when OOB.  Her labs were reviewed and her potassium is now stable at 3.9 down from 5.4.  Last WBC was low at 3.1 previously 8.6 lab to be checked in the a.m. her back pain is controlled.  Weights have been stable over the last week      ALLERGIES:Penicillins    PAST MEDICAL HISTORY: History reviewed. No pertinent past medical " "history.    PAST SURGICAL HISTORY:   has no past surgical history on file.    FAMILY HISTORY: family history is not on file.    SOCIAL HISTORY:  has an unknown smoking status. She has never used smokeless tobacco.    ROS:  Constitutional: Negative for activity change, appetite change, fatigue and fever.   HENT: Negative for congestion.    Respiratory: Negative for cough, shortness of breath and wheezing.    Cardiovascular: Negative for chest pain and leg swelling. Atrial fibrillation   Gastrointestinal: Negative for abdominal distention, abdominal pain, constipation, diarrhea and nausea.   Genitourinary: Negative for dysuria.   Musculoskeletal: Negative for arthralgia.positive for back pain.   Skin: Negative for color change and wound.   Neurological: Negative for dizziness.   Psychiatric/Behavioral: Negative for agitation, behavioral problems and confusion.     Physical Exam:  Constitutional:       Appearance: Patient is well-developed.   HENT:      Head: Normocephalic.   Eyes:      Conjunctiva/sclera: Conjunctivae normal.   Neck:      Musculoskeletal: Normal range of motion.   Cardiovascular:      Rate and Rhythm: Normal rate and regular rhythm.      Heart sounds: Normal heart sounds. No murmur.   Pulmonary:      Effort: No respiratory distress.      Breath sounds: Normal breath sounds. No wheezing or rales.   Abdominal:      General: Bowel sounds are normal. There is no distension.      Palpations: Abdomen is soft.      Tenderness: There is no abdominal tenderness.   Musculoskeletal:       Normal range of motion.     Skin:General:        Skin is warm.   Neurological:         Mental Status: Patient is alert and oriented to person, place, and time.   Psychiatric:         Behavior: Behavior normal.     Vitals:/80   Pulse 85   Temp 97.2  F (36.2  C)   Resp 20   Ht 1.626 m (5' 4\")   Wt 48.1 kg (106 lb)   SpO2 96%   BMI 18.19 kg/m   and Body mass index is 18.19 kg/m .    Lab/Diagnostic data:   Recent " Results (from the past 240 hour(s))   Basic metabolic panel    Collection Time: 02/01/22  6:59 AM   Result Value Ref Range    Sodium 132 (L) 136 - 145 mmol/L    Potassium 3.0 (L) 3.5 - 5.0 mmol/L    Chloride 95 (L) 98 - 107 mmol/L    Carbon Dioxide (CO2) 28 22 - 31 mmol/L    Anion Gap 9 5 - 18 mmol/L    Urea Nitrogen 23 8 - 28 mg/dL    Creatinine 0.67 0.60 - 1.10 mg/dL    Calcium 9.3 8.5 - 10.5 mg/dL    Glucose 85 70 - 125 mg/dL    GFR Estimate 86 >60 mL/min/1.73m2   CBC with platelets    Collection Time: 02/01/22  6:59 AM   Result Value Ref Range    WBC Count 3.1 (L) 4.0 - 11.0 10e3/uL    RBC Count 3.70 (L) 3.80 - 5.20 10e6/uL    Hemoglobin 12.7 11.7 - 15.7 g/dL    Hematocrit 36.9 35.0 - 47.0 %     78 - 100 fL    MCH 34.3 (H) 26.5 - 33.0 pg    MCHC 34.4 31.5 - 36.5 g/dL    RDW 12.6 10.0 - 15.0 %    Platelet Count 245 150 - 450 10e3/uL   Magnesium    Collection Time: 02/01/22  6:59 AM   Result Value Ref Range    Magnesium 1.5 (L) 1.8 - 2.6 mg/dL   Potassium    Collection Time: 02/03/22  6:50 AM   Result Value Ref Range    Potassium 5.4 (H) 3.5 - 5.0 mmol/L   Potassium    Collection Time: 02/04/22  6:52 AM   Result Value Ref Range    Potassium 3.9 3.5 - 5.0 mmol/L       MEDICATIONS:     Review of your medicines          Accurate as of February 7, 2022  9:50 AM. If you have any questions, ask your nurse or doctor.            CONTINUE these medicines which have NOT CHANGED      Dose / Directions   * acetaminophen 650 MG CR tablet  Commonly known as: TYLENOL      Dose: 650 mg  Take 650 mg by mouth every 24 hours  Refills: 0     * acetaminophen 500 MG tablet  Commonly known as: TYLENOL      Dose: 1,000 mg  Take 1,000 mg by mouth 3 times daily  Refills: 0     apixaban ANTICOAGULANT 2.5 MG tablet  Commonly known as: ELIQUIS      Dose: 2.5 mg  Take 2.5 mg by mouth 2 times daily  Refills: 0     atorvastatin 40 MG tablet  Commonly known as: LIPITOR      Dose: 40 mg  Take 40 mg by mouth daily  Refills: 0     CALCIUM  CITRATE PO      Dose: 600 mg  Take 600 mg by mouth daily  Refills: 0     clopidogrel 75 MG tablet  Commonly known as: PLAVIX      Dose: 75 mg  Take 75 mg by mouth daily  Refills: 0     diclofenac 1 % topical gel  Commonly known as: VOLTAREN      Apply topically every 12 hours  Refills: 0     ezetimibe 10 MG tablet  Commonly known as: ZETIA      Dose: 10 mg  Take 10 mg by mouth daily  Refills: 0     hydrochlorothiazide 12.5 MG tablet  Commonly known as: HYDRODIURIL      Dose: 50 mg  Take 50 mg by mouth daily  Refills: 0     lidocaine 5 % patch  Commonly known as: LIDODERM      Dose: 1 patch  Place 1 patch onto the skin every 24 hours To prevent lidocaine toxicity, patient should be patch free for 12 hrs daily.  Refills: 0     lisinopril 30 MG tablet  Commonly known as: ZESTRIL      Dose: 10 mg  Take 10 mg by mouth daily (hold for SBP <110)  Refills: 0     magnesium oxide 400 MG tablet  Commonly known as: MAG-OX      Dose: 400 mg  Take 400 mg by mouth daily  Refills: 0     methocarbamol 750 MG tablet  Commonly known as: ROBAXIN      Dose: 750 mg  Take 750 mg by mouth every 6 hours as needed for muscle spasms  Refills: 0     metoprolol tartrate 25 MG tablet  Commonly known as: LOPRESSOR      Dose: 25 mg  Take 25 mg by mouth 2 times daily  Refills: 0     nitroGLYcerin 0.4 MG sublingual tablet  Commonly known as: NITROSTAT      Dose: 0.4 mg  Place 0.4 mg under the tongue every 5 minutes as needed for chest pain For chest pain place 1 tablet under the tongue every 5 minutes for 3 doses. If symptoms persist 5 minutes after 1st dose call 911.  Refills: 0     ondansetron 4 MG ODT tab  Commonly known as: ZOFRAN-ODT      Dose: 4 mg  Take 4 mg by mouth every 6 hours as needed for nausea  Refills: 0     polyethylene glycol 17 g packet  Commonly known as: MIRALAX      Dose: 1 packet  Take 1 packet by mouth daily  Refills: 0     raloxifene 60 MG tablet  Commonly known as: EVISTA      Dose: 60 mg  Take 60 mg by mouth  daily  Refills: 0     senna-docusate 8.6-50 MG tablet  Commonly known as: SENOKOT-S/PERICOLACE      Dose: 1 tablet  Take 1 tablet by mouth 2 times daily as needed for constipation And 2 tabs q 12 hours prn  Refills: 0     simethicone 80 MG chewable tablet  Commonly known as: MYLICON      Dose: 160 mg  Take 160 mg by mouth 2 times daily  Refills: 0     traMADol 50 MG tablet  Commonly known as: ULTRAM      Dose: 50 mg  Take 50 mg by mouth every 4 hours as needed for severe pain  Refills: 0     Vitamin D3 25 mcg (1000 units) tablet  Commonly known as: CHOLECALCIFEROL      Dose: 2 tablet  Take 2 tablets by mouth daily  Refills: 0         * This list has 2 medication(s) that are the same as other medications prescribed for you. Read the directions carefully, and ask your doctor or other care provider to review them with you.            STOP taking    fenoprofen calcium 600 MG Tabs tablet  Stopped by: Poornima Messina CNP               ASSESSMENT/PLAN  Encounter Diagnoses   Name Primary?     Closed fracture of sacrum with routine healing, unspecified portion of sacrum, subsequent encounter Yes     Pain management      Angina of effort (H)      Physical deconditioning      Osteoporosis patient with pathological T7 fracture. Continue calcium carbonate, calcium citrate, vitamin D, raloxifene    Pain management tramadol 50 mg every 4 hours as needed, lidocaine patch as scheduled, diclofenac gel as needed, extended release and extra strength Tylenol    Physical deconditioning PT OT    Atrial fibrillation with Toprol tartrate 25 mg twice daily, Eliquis 2.5 mg twice daily    HDL continue atorvastatin    Electronically signed by: Poornima Messina CNP

## 2022-02-07 NOTE — LETTER
"    2/7/2022        RE: Mary Mederos   [address]  [facility] [address]        [facility] GERIATRIC [facility]  Chief Complaint   Patient presents with     SHIVAM     [facility] Record Number:  [id number]  Place of Service where encounter took place:  [facility] ([facility]) [[id number]]  Code Status:  No Order     HISTORY:      HPI:  Mary Mederos  is 81 year old (1940) undergoing physical and occupational therapy.   She is with past medical history hypertension, hyperlipidemia, atrial fibrillation currently on Eliquis, Per hospital records she was brought to the  ED by ambulance after she fell down 5-10 stairs at her home. Patient's variable alarm went off and alerted EMS. When EMS got to the scene her family that were living next door had lifted her off the floor and she was sitting in a chair.  On arrival in the ED patient was awake and alert. She denied any headache. C-collar was in place. She did complain of pain in both her upper back and lower back. Pain was worse with movement. She denies any shortness of breath. She states that she felt that she had stumbled. She denied any lightheadedness chest pain or palpitations prior to the fall. She was found to have a T7 fracture.    Today she is seen to review vital signs, labs, pain management, and for reports of chest pain.  She reported an ache across the top of her chest.  It appears it just came on this morning.  She denies any kind of pressure numbness tingling or radiating pain.  She reports this does not feel like my \"heart attack pain\".  She was given a dose of nitroglycerin and the pain completely subsided.  She denied shortness of breath cough congestion constipation or diarrhea.  TLSO when OOB.  Her labs were reviewed and her potassium is now stable at 3.9 down from 5.4.  Last WBC was low at 3.1 previously 8.6 lab to be checked in the a.m. her back pain is controlled.  Weights have been stable over the last " "week      ALLERGIES:Penicillins    PAST MEDICAL HISTORY: History reviewed. No pertinent past medical history.    PAST SURGICAL HISTORY:   has no past surgical history on file.    FAMILY HISTORY: family history is not on file.    SOCIAL HISTORY:  has an unknown smoking status. She has never used smokeless tobacco.    ROS:  Constitutional: Negative for activity change, appetite change, fatigue and fever.   HENT: Negative for congestion.    Respiratory: Negative for cough, shortness of breath and wheezing.    Cardiovascular: Negative for chest pain and leg swelling. Atrial fibrillation   Gastrointestinal: Negative for abdominal distention, abdominal pain, constipation, diarrhea and nausea.   Genitourinary: Negative for dysuria.   Musculoskeletal: Negative for arthralgia.positive for back pain.   Skin: Negative for color change and wound.   Neurological: Negative for dizziness.   Psychiatric/Behavioral: Negative for agitation, behavioral problems and confusion.     Physical Exam:  Constitutional:       Appearance: Patient is well-developed.   HENT:      Head: Normocephalic.   Eyes:      Conjunctiva/sclera: Conjunctivae normal.   Neck:      Musculoskeletal: Normal range of motion.   Cardiovascular:      Rate and Rhythm: Normal rate and regular rhythm.      Heart sounds: Normal heart sounds. No murmur.   Pulmonary:      Effort: No respiratory distress.      Breath sounds: Normal breath sounds. No wheezing or rales.   Abdominal:      General: Bowel sounds are normal. There is no distension.      Palpations: Abdomen is soft.      Tenderness: There is no abdominal tenderness.   Musculoskeletal:       Normal range of motion.     Skin:General:        Skin is warm.   Neurological:         Mental Status: Patient is alert and oriented to person, place, and time.   Psychiatric:         Behavior: Behavior normal.     Vitals:/80   Pulse 85   Temp 97.2  F (36.2  C)   Resp 20   Ht 1.626 m (5' 4\")   Wt 48.1 kg (106 lb)   " SpO2 96%   BMI 18.19 kg/m   and Body mass index is 18.19 kg/m .    Lab/Diagnostic data:   Recent Results (from the past 240 hour(s))   Basic metabolic panel    Collection Time: 02/01/22  6:59 AM   Result Value Ref Range    Sodium 132 (L) 136 - 145 mmol/L    Potassium 3.0 (L) 3.5 - 5.0 mmol/L    Chloride 95 (L) 98 - 107 mmol/L    Carbon Dioxide (CO2) 28 22 - 31 mmol/L    Anion Gap 9 5 - 18 mmol/L    Urea Nitrogen 23 8 - 28 mg/dL    Creatinine 0.67 0.60 - 1.10 mg/dL    Calcium 9.3 8.5 - 10.5 mg/dL    Glucose 85 70 - 125 mg/dL    GFR Estimate 86 >60 mL/min/1.73m2   CBC with platelets    Collection Time: 02/01/22  6:59 AM   Result Value Ref Range    WBC Count 3.1 (L) 4.0 - 11.0 10e3/uL    RBC Count 3.70 (L) 3.80 - 5.20 10e6/uL    Hemoglobin 12.7 11.7 - 15.7 g/dL    Hematocrit 36.9 35.0 - 47.0 %     78 - 100 fL    MCH 34.3 (H) 26.5 - 33.0 pg    MCHC 34.4 31.5 - 36.5 g/dL    RDW 12.6 10.0 - 15.0 %    Platelet Count 245 150 - 450 10e3/uL   Magnesium    Collection Time: 02/01/22  6:59 AM   Result Value Ref Range    Magnesium 1.5 (L) 1.8 - 2.6 mg/dL   Potassium    Collection Time: 02/03/22  6:50 AM   Result Value Ref Range    Potassium 5.4 (H) 3.5 - 5.0 mmol/L   Potassium    Collection Time: 02/04/22  6:52 AM   Result Value Ref Range    Potassium 3.9 3.5 - 5.0 mmol/L       MEDICATIONS:     Review of your medicines          Accurate as of February 7, 2022  9:50 AM. If you have any questions, ask your nurse or doctor.            CONTINUE these medicines which have NOT CHANGED      Dose / Directions   * acetaminophen 650 MG CR tablet  Commonly known as: TYLENOL      Dose: 650 mg  Take 650 mg by mouth every 24 hours  Refills: 0     * acetaminophen 500 MG tablet  Commonly known as: TYLENOL      Dose: 1,000 mg  Take 1,000 mg by mouth 3 times daily  Refills: 0     apixaban ANTICOAGULANT 2.5 MG tablet  Commonly known as: ELIQUIS      Dose: 2.5 mg  Take 2.5 mg by mouth 2 times daily  Refills: 0     atorvastatin 40 MG  tablet  Commonly known as: LIPITOR      Dose: 40 mg  Take 40 mg by mouth daily  Refills: 0     CALCIUM CITRATE PO      Dose: 600 mg  Take 600 mg by mouth daily  Refills: 0     clopidogrel 75 MG tablet  Commonly known as: PLAVIX      Dose: 75 mg  Take 75 mg by mouth daily  Refills: 0     diclofenac 1 % topical gel  Commonly known as: VOLTAREN      Apply topically every 12 hours  Refills: 0     ezetimibe 10 MG tablet  Commonly known as: ZETIA      Dose: 10 mg  Take 10 mg by mouth daily  Refills: 0     hydrochlorothiazide 12.5 MG tablet  Commonly known as: HYDRODIURIL      Dose: 50 mg  Take 50 mg by mouth daily  Refills: 0     lidocaine 5 % patch  Commonly known as: LIDODERM      Dose: 1 patch  Place 1 patch onto the skin every 24 hours To prevent lidocaine toxicity, patient should be patch free for 12 hrs daily.  Refills: 0     lisinopril 30 MG tablet  Commonly known as: ZESTRIL      Dose: 10 mg  Take 10 mg by mouth daily (hold for SBP <110)  Refills: 0     magnesium oxide 400 MG tablet  Commonly known as: MAG-OX      Dose: 400 mg  Take 400 mg by mouth daily  Refills: 0     methocarbamol 750 MG tablet  Commonly known as: ROBAXIN      Dose: 750 mg  Take 750 mg by mouth every 6 hours as needed for muscle spasms  Refills: 0     metoprolol tartrate 25 MG tablet  Commonly known as: LOPRESSOR      Dose: 25 mg  Take 25 mg by mouth 2 times daily  Refills: 0     nitroGLYcerin 0.4 MG sublingual tablet  Commonly known as: NITROSTAT      Dose: 0.4 mg  Place 0.4 mg under the tongue every 5 minutes as needed for chest pain For chest pain place 1 tablet under the tongue every 5 minutes for 3 doses. If symptoms persist 5 minutes after 1st dose call 911.  Refills: 0     ondansetron 4 MG ODT tab  Commonly known as: ZOFRAN-ODT      Dose: 4 mg  Take 4 mg by mouth every 6 hours as needed for nausea  Refills: 0     polyethylene glycol 17 g packet  Commonly known as: MIRALAX      Dose: 1 packet  Take 1 packet by mouth daily  Refills: 0      raloxifene 60 MG tablet  Commonly known as: EVISTA      Dose: 60 mg  Take 60 mg by mouth daily  Refills: 0     senna-docusate 8.6-50 MG tablet  Commonly known as: SENOKOT-S/PERICOLACE      Dose: 1 tablet  Take 1 tablet by mouth 2 times daily as needed for constipation And 2 tabs q 12 hours prn  Refills: 0     simethicone 80 MG chewable tablet  Commonly known as: MYLICON      Dose: 160 mg  Take 160 mg by mouth 2 times daily  Refills: 0     traMADol 50 MG tablet  Commonly known as: ULTRAM      Dose: 50 mg  Take 50 mg by mouth every 4 hours as needed for severe pain  Refills: 0     Vitamin D3 25 mcg (1000 units) tablet  Commonly known as: CHOLECALCIFEROL      Dose: 2 tablet  Take 2 tablets by mouth daily  Refills: 0         * This list has 2 medication(s) that are the same as other medications prescribed for you. Read the directions carefully, and ask your doctor or other care provider to review them with you.            STOP taking    fenoprofen calcium 600 MG Tabs tablet  Stopped by: Poornima Messina CNP               ASSESSMENT/PLAN  Encounter Diagnoses   Name Primary?     Closed fracture of sacrum with routine healing, unspecified portion of sacrum, subsequent encounter Yes     Pain management      Angina of effort (H)      Physical deconditioning      Osteoporosis patient with pathological T7 fracture. Continue calcium carbonate, calcium citrate, vitamin D, raloxifene    Pain management tramadol 50 mg every 4 hours as needed, lidocaine patch as scheduled, diclofenac gel as needed, extended release and extra strength Tylenol    Physical deconditioning PT OT    Atrial fibrillation with Toprol tartrate 25 mg twice daily, Eliquis 2.5 mg twice daily    HDL continue atorvastatin    Electronically signed by: Poornima Messina CNP        Sincerely,        Poornima Messina CNP

## 2022-02-08 NOTE — TELEPHONE ENCOUNTER
[facility] Triage Nurse Telephone Encounter    Provider: Flaca Solano MD  Facility: [facility]  Facility Type:  TCU    Caller: Luis Antonio  Call Back Number: [phone]    Allergies:    Allergies   Allergen Reactions     Penicillins Rash        Reason for call: Nurse is calling to report Heme 2 results.  Notable meds:  Plavix 75mg daily.      Verbal Order/Direction given by Provider: No new orders.      Provider giving Order:  Flaca Solano MD    Verbal Order given to: Luis Antonio Muñoz RN

## 2022-02-08 NOTE — PROGRESS NOTES
[facility] GERIATRIC SERVICES       Patient Mary Mederos  MRN: [id number]        Reason for Visit     Chief Complaint   Patient presents with     Nursing Home Acute     Follow-up pain /low BP  Code Status     CPR/Full code     Assessment     Fall  Sacral fracture  Compression fracture T7  HTN WITH hypotension.  Hyperkalemia - RESOLVED  Electrolyte imbalance with low potassium and magnesium  Generalized weakness  leukopenia white count is 3.1  osteopenia on multiple doses of calcium supplementation    Plan     Pt is admitted to TCU for strengthening and rehab.  Given conservative treatment  Cont tlso  WBAT  Pain management reviewed and she denies any pain at rest.  Unfortunately progress in therapy is quite limited and patient is noted to be bedbound complaining of fatigue.  She has refused a TLSO brace as ordered from the hospital  Blood pressures review done.  Nursing requesting parameters.  Her blood pressure today is 106/75 before medication  Lisinopril was recently reduced from 30 mg to 10 mg and her blood pressures are still low  She reports of good oral intake  DC lisinopril  Will reduce her HCTZ from 50 mg to 25 mg with hold parameters  Continue her metoprolol for now  Monitor blood pressure trends  Patient again encouraged to ambulate and get out of bed and not lay flat in bed        History     Patient is a very pleasant 81 year old female who is admitted to TCU  She admitted post fall does not recall how  She was admitted in hospital with sacral fracture  This will be treated conservatively  She was noted to have a T7 compression fracture  Saw ortho has been fitted with TLSO  Pain was optimized   She is on scheduled Tylenol in the TCU but now reporting that her pain is worse.  She is bedbound today  She is somewhat noncompliant with her TLSO and refuses to wear it as per staff.  She reports that she is profoundly fatigued all the time and she is sleeping most of the day  BP remain very low and she is  on 3 antihypertensives  Reports low BP at home.  Recently and lisinopril was reduced to 10 mg from 30 mg /  Her systolic blood pressure continues to be on the low side and nursing is requesting parameters  Denies any discomfort but is not ambulating very well without any issues    Chief Complaint   Patient presents with     Nursing Home Acute       Past Medical & Surgical History     HTN  [facility]  HLD    Past Social History     Reviewed,  has an unknown smoking status. She has never used smokeless tobacco.   Does have wine occasionaly    Family History     Reviewed, and includes cad in [place] mother and father    Medication List   Post Discharge Medication Reconciliation Status: Post Discharge Medication Reconciliation Status: discharge medications reconciled, continue medications without change.  Current Outpatient Medications   Medication     acetaminophen (TYLENOL) 500 MG tablet     acetaminophen (TYLENOL) 650 MG CR tablet     apixaban ANTICOAGULANT (ELIQUIS) 2.5 MG tablet     atorvastatin (LIPITOR) 40 MG tablet     CALCIUM CITRATE PO     clopidogrel (PLAVIX) 75 MG tablet     diclofenac (VOLTAREN) 1 % topical gel     ezetimibe (ZETIA) 10 MG tablet     hydrochlorothiazide (HYDRODIURIL) 12.5 MG tablet     lidocaine (LIDODERM) 5 % patch     lisinopril (ZESTRIL) 30 MG tablet     magnesium oxide (MAG-OX) 400 MG tablet     methocarbamol (ROBAXIN) 750 MG tablet     metoprolol tartrate (LOPRESSOR) 25 MG tablet     nitroGLYcerin (NITROSTAT) 0.4 MG sublingual tablet     ondansetron (ZOFRAN-ODT) 4 MG ODT tab     polyethylene glycol (MIRALAX) 17 g packet     raloxifene (EVISTA) 60 MG tablet     senna-docusate (SENOKOT-S/PERICOLACE) 8.6-50 MG tablet     simethicone (MYLICON) 80 MG chewable tablet     traMADol (ULTRAM) 50 MG tablet     Vitamin D3 (CHOLECALCIFEROL) 25 mcg (1000 units) tablet     No current facility-administered medications for this visit.          Allergies     Allergies   Allergen Reactions     Penicillins Rash  "      Review of Systems   A comprehensive review of 14 systems was done. Pertinent findings noted here and in history of present illness. All the rest negative.  Constitutional: Negative.  Negative for fever, chills, she has  activity change, appetite change and fatigue.   BP are very low and pt states that is normal  HENT: Negative for congestion and facial swelling.    Eyes: Negative for photophobia, redness and visual disturbance.   Respiratory: Negative for cough and chest tightness.    Cardiovascular: Negative for chest pain, palpitations and leg swelling.   Gastrointestinal: Negative for nausea, diarrhea, constipation, blood in stool and abdominal distention.   Genitourinary: Negative.    Musculoskeletal: Negative. Having significant  pain at rest and not using any tramadol  Skin: Negative.    Neurological: Negative for dizziness, tremors, syncope, weakness, light-headedness and headaches.   Hematological: Does not bruise/bleed easily.   Psychiatric/Behavioral: Negative.        Physical Exam   /75   Pulse 81   Temp 96.8  F (36  C)   Resp 20   Ht 1.626 m (5' 4\")   Wt 47.6 kg (105 lb)   SpO2 99%   BMI 18.02 kg/m       Constitutional: Oriented to person, place, and time and appears well-developed.   HEENT:  Normocephalic and atraumatic.  Eyes: Conjunctivae and EOM are normal. Pupils are equal, round, and reactive to light. No discharge.  No scleral icterus. Nose normal. Mouth/Throat: Oropharynx is clear and moist. No oropharyngeal exudate.    NECK: Normal range of motion. Neck supple. No JVD present. No tracheal deviation present. No thyromegaly present.   CARDIOVASCULAR: Normal rate, regular rhythm and intact distal pulses.  Exam reveals no gallop and no friction rub.  Systolic murmur present.  PULMONARY: Effort normal and breath sounds normal. No respiratory distress.No Wheezing or rales.  ABDOMEN: Soft. Bowel sounds are normal. No distension and no mass.  There is no tenderness. There is no " rebound and no guarding. No HSM.  MUSCULOSKELETAL: Normal range of motion. No edema and no tenderness. Mild kyphosis, thorasic spine tenderness.  Has a tlso brace  LYMPH NODES: Has no cervical, supraclavicular, axillary and groin adenopathy.   NEUROLOGICAL: Alert and oriented to person, place, and time. No cranial nerve deficit.  Normal muscle tone. Coordination normal.   GENITOURINARY: Deferred exam.  SKIN: Skin is warm and dry. No rash noted. No erythema. No pallor.   EXTREMITIES: No cyanosis, no clubbing, no edema. No Deformity.  PSYCHIATRIC: Normal mood, affect and behavior.  Noted to be bedbound and sleeping most of the time  Refuses to wear her TLSO      Lab Results     Recent Results (from the past 240 hour(s))   Basic metabolic panel    Collection Time: 02/01/22  6:59 AM   Result Value Ref Range    Sodium 132 (L) 136 - 145 mmol/L    Potassium 3.0 (L) 3.5 - 5.0 mmol/L    Chloride 95 (L) 98 - 107 mmol/L    Carbon Dioxide (CO2) 28 22 - 31 mmol/L    Anion Gap 9 5 - 18 mmol/L    Urea Nitrogen 23 8 - 28 mg/dL    Creatinine 0.67 0.60 - 1.10 mg/dL    Calcium 9.3 8.5 - 10.5 mg/dL    Glucose 85 70 - 125 mg/dL    GFR Estimate 86 >60 mL/min/1.73m2   CBC with platelets    Collection Time: 02/01/22  6:59 AM   Result Value Ref Range    WBC Count 3.1 (L) 4.0 - 11.0 10e3/uL    RBC Count 3.70 (L) 3.80 - 5.20 10e6/uL    Hemoglobin 12.7 11.7 - 15.7 g/dL    Hematocrit 36.9 35.0 - 47.0 %     78 - 100 fL    MCH 34.3 (H) 26.5 - 33.0 pg    MCHC 34.4 31.5 - 36.5 g/dL    RDW 12.6 10.0 - 15.0 %    Platelet Count 245 150 - 450 10e3/uL   Magnesium    Collection Time: 02/01/22  6:59 AM   Result Value Ref Range    Magnesium 1.5 (L) 1.8 - 2.6 mg/dL   Potassium    Collection Time: 02/03/22  6:50 AM   Result Value Ref Range    Potassium 5.4 (H) 3.5 - 5.0 mmol/L   Potassium    Collection Time: 02/04/22  6:52 AM   Result Value Ref Range    Potassium 3.9 3.5 - 5.0 mmol/L   CBC with platelets and differential    Collection Time: 02/08/22   9:04 AM   Result Value Ref Range    WBC Count 4.2 4.0 - 11.0 10e3/uL    RBC Count 3.86 3.80 - 5.20 10e6/uL    Hemoglobin 13.7 11.7 - 15.7 g/dL    Hematocrit 39.9 35.0 - 47.0 %     (H) 78 - 100 fL    MCH 35.5 (H) 26.5 - 33.0 pg    MCHC 34.3 31.5 - 36.5 g/dL    RDW 12.9 10.0 - 15.0 %    Platelet Count 224 150 - 450 10e3/uL    % Neutrophils 66 %    % Lymphocytes 23 %    % Monocytes 9 %    % Eosinophils 1 %    % Basophils 1 %    % Immature Granulocytes 0 %    NRBCs per 100 WBC 0 <1 /100    Absolute Neutrophils 2.8 1.6 - 8.3 10e3/uL    Absolute Lymphocytes 1.0 0.8 - 5.3 10e3/uL    Absolute Monocytes 0.4 0.0 - 1.3 10e3/uL    Absolute Eosinophils 0.0 0.0 - 0.7 10e3/uL    Absolute Basophils 0.0 0.0 - 0.2 10e3/uL    Absolute Immature Granulocytes 0.0 <=0.4 10e3/uL    Absolute NRBCs 0.0 10e3/uL           Electronically signed by    Flaca Solano MD

## 2022-02-08 NOTE — LETTER
2/8/2022        RE: Mary Mederos   [facility] [id number]        [facility] GERIATRIC SERVICES       Patient Mary Mederos  MRN: [id number]        Reason for Visit     Chief Complaint   Patient presents with     Nursing Home Acute     Follow-up pain /low BP  Code Status     CPR/Full code     Assessment     Fall  Sacral fracture  Compression fracture T7  HTN WITH hypotension.  Hyperkalemia - RESOLVED  Electrolyte imbalance with low potassium and magnesium  Generalized weakness  leukopenia white count is 3.1  osteopenia on multiple doses of calcium supplementation    Plan     Pt is admitted to TCU for strengthening and rehab.  Given conservative treatment  Cont tlso  WBAT  Pain management reviewed and she denies any pain at rest.  Unfortunately progress in therapy is quite limited and patient is noted to be bedbound complaining of fatigue.  She has refused a TLSO brace as ordered from the hospital  Blood pressures review done.  Nursing requesting parameters.  Her blood pressure today is 106/75 before medication  Lisinopril was recently reduced from 30 mg to 10 mg and her blood pressures are still low  She reports of good oral intake  DC lisinopril  Will reduce her HCTZ from 50 mg to 25 mg with hold parameters  Continue her metoprolol for now  Monitor blood pressure trends  Patient again encouraged to ambulate and get out of bed and not lay flat in bed        History     Patient is a very pleasant 81 year old female who is admitted to TCU  She admitted post fall does not recall how  She was admitted in hospital with sacral fracture  This will be treated conservatively  She was noted to have a T7 compression fracture  Saw ortho has been fitted with TLSO  Pain was optimized   She is on scheduled Tylenol in the TCU but now reporting that her pain is worse.  She is bedbound today  She is somewhat noncompliant with her TLSO and refuses to wear it as per staff.  She reports that she is profoundly  fatigued all the time and she is sleeping most of the day  BP remain very low and she is on 3 antihypertensives  Reports low BP at home.  Recently and lisinopril was reduced to 10 mg from 30 mg /  Her systolic blood pressure continues to be on the low side and nursing is requesting parameters  Denies any discomfort but is not ambulating very well without any issues    Chief Complaint   Patient presents with     Nursing Home Acute       Past Medical & Surgical History     HTN  [facility]  HLD    Past Social History     Reviewed,  has an unknown smoking status. She has never used smokeless tobacco.   Does have wine occasionaly    Family History     Reviewed, and includes cad in [place] mother and father    Medication List   Post Discharge Medication Reconciliation Status: Post Discharge Medication Reconciliation Status: discharge medications reconciled, continue medications without change.  Current Outpatient Medications   Medication     acetaminophen (TYLENOL) 500 MG tablet     acetaminophen (TYLENOL) 650 MG CR tablet     apixaban ANTICOAGULANT (ELIQUIS) 2.5 MG tablet     atorvastatin (LIPITOR) 40 MG tablet     CALCIUM CITRATE PO     clopidogrel (PLAVIX) 75 MG tablet     diclofenac (VOLTAREN) 1 % topical gel     ezetimibe (ZETIA) 10 MG tablet     hydrochlorothiazide (HYDRODIURIL) 12.5 MG tablet     lidocaine (LIDODERM) 5 % patch     lisinopril (ZESTRIL) 30 MG tablet     magnesium oxide (MAG-OX) 400 MG tablet     methocarbamol (ROBAXIN) 750 MG tablet     metoprolol tartrate (LOPRESSOR) 25 MG tablet     nitroGLYcerin (NITROSTAT) 0.4 MG sublingual tablet     ondansetron (ZOFRAN-ODT) 4 MG ODT tab     polyethylene glycol (MIRALAX) 17 g packet     raloxifene (EVISTA) 60 MG tablet     senna-docusate (SENOKOT-S/PERICOLACE) 8.6-50 MG tablet     simethicone (MYLICON) 80 MG chewable tablet     traMADol (ULTRAM) 50 MG tablet     Vitamin D3 (CHOLECALCIFEROL) 25 mcg (1000 units) tablet     No current facility-administered medications for  "this visit.          Allergies     Allergies   Allergen Reactions     Penicillins Rash       Review of Systems   A comprehensive review of 14 systems was done. Pertinent findings noted here and in history of present illness. All the rest negative.  Constitutional: Negative.  Negative for fever, chills, she has  activity change, appetite change and fatigue.   BP are very low and pt states that is normal  HENT: Negative for congestion and facial swelling.    Eyes: Negative for photophobia, redness and visual disturbance.   Respiratory: Negative for cough and chest tightness.    Cardiovascular: Negative for chest pain, palpitations and leg swelling.   Gastrointestinal: Negative for nausea, diarrhea, constipation, blood in stool and abdominal distention.   Genitourinary: Negative.    Musculoskeletal: Negative. Having significant  pain at rest and not using any tramadol  Skin: Negative.    Neurological: Negative for dizziness, tremors, syncope, weakness, light-headedness and headaches.   Hematological: Does not bruise/bleed easily.   Psychiatric/Behavioral: Negative.        Physical Exam   /75   Pulse 81   Temp 96.8  F (36  C)   Resp 20   Ht 1.626 m (5' 4\")   Wt 47.6 kg (105 lb)   SpO2 99%   BMI 18.02 kg/m       Constitutional: Oriented to person, place, and time and appears well-developed.   HEENT:  Normocephalic and atraumatic.  Eyes: Conjunctivae and EOM are normal. Pupils are equal, round, and reactive to light. No discharge.  No scleral icterus. Nose normal. Mouth/Throat: Oropharynx is clear and moist. No oropharyngeal exudate.    NECK: Normal range of motion. Neck supple. No JVD present. No tracheal deviation present. No thyromegaly present.   CARDIOVASCULAR: Normal rate, regular rhythm and intact distal pulses.  Exam reveals no gallop and no friction rub.  Systolic murmur present.  PULMONARY: Effort normal and breath sounds normal. No respiratory distress.No Wheezing or rales.  ABDOMEN: Soft. Bowel " sounds are normal. No distension and no mass.  There is no tenderness. There is no rebound and no guarding. No HSM.  MUSCULOSKELETAL: Normal range of motion. No edema and no tenderness. Mild kyphosis, thorasic spine tenderness.  Has a tlso brace  LYMPH NODES: Has no cervical, supraclavicular, axillary and groin adenopathy.   NEUROLOGICAL: Alert and oriented to person, place, and time. No cranial nerve deficit.  Normal muscle tone. Coordination normal.   GENITOURINARY: Deferred exam.  SKIN: Skin is warm and dry. No rash noted. No erythema. No pallor.   EXTREMITIES: No cyanosis, no clubbing, no edema. No Deformity.  PSYCHIATRIC: Normal mood, affect and behavior.  Noted to be bedbound and sleeping most of the time  Refuses to wear her TLSO      Lab Results     Recent Results (from the past 240 hour(s))   Basic metabolic panel    Collection Time: 02/01/22  6:59 AM   Result Value Ref Range    Sodium 132 (L) 136 - 145 mmol/L    Potassium 3.0 (L) 3.5 - 5.0 mmol/L    Chloride 95 (L) 98 - 107 mmol/L    Carbon Dioxide (CO2) 28 22 - 31 mmol/L    Anion Gap 9 5 - 18 mmol/L    Urea Nitrogen 23 8 - 28 mg/dL    Creatinine 0.67 0.60 - 1.10 mg/dL    Calcium 9.3 8.5 - 10.5 mg/dL    Glucose 85 70 - 125 mg/dL    GFR Estimate 86 >60 mL/min/1.73m2   CBC with platelets    Collection Time: 02/01/22  6:59 AM   Result Value Ref Range    WBC Count 3.1 (L) 4.0 - 11.0 10e3/uL    RBC Count 3.70 (L) 3.80 - 5.20 10e6/uL    Hemoglobin 12.7 11.7 - 15.7 g/dL    Hematocrit 36.9 35.0 - 47.0 %     78 - 100 fL    MCH 34.3 (H) 26.5 - 33.0 pg    MCHC 34.4 31.5 - 36.5 g/dL    RDW 12.6 10.0 - 15.0 %    Platelet Count 245 150 - 450 10e3/uL   Magnesium    Collection Time: 02/01/22  6:59 AM   Result Value Ref Range    Magnesium 1.5 (L) 1.8 - 2.6 mg/dL   Potassium    Collection Time: 02/03/22  6:50 AM   Result Value Ref Range    Potassium 5.4 (H) 3.5 - 5.0 mmol/L   Potassium    Collection Time: 02/04/22  6:52 AM   Result Value Ref Range    Potassium 3.9  3.5 - 5.0 mmol/L   CBC with platelets and differential    Collection Time: 02/08/22  9:04 AM   Result Value Ref Range    WBC Count 4.2 4.0 - 11.0 10e3/uL    RBC Count 3.86 3.80 - 5.20 10e6/uL    Hemoglobin 13.7 11.7 - 15.7 g/dL    Hematocrit 39.9 35.0 - 47.0 %     (H) 78 - 100 fL    MCH 35.5 (H) 26.5 - 33.0 pg    MCHC 34.3 31.5 - 36.5 g/dL    RDW 12.9 10.0 - 15.0 %    Platelet Count 224 150 - 450 10e3/uL    % Neutrophils 66 %    % Lymphocytes 23 %    % Monocytes 9 %    % Eosinophils 1 %    % Basophils 1 %    % Immature Granulocytes 0 %    NRBCs per 100 WBC 0 <1 /100    Absolute Neutrophils 2.8 1.6 - 8.3 10e3/uL    Absolute Lymphocytes 1.0 0.8 - 5.3 10e3/uL    Absolute Monocytes 0.4 0.0 - 1.3 10e3/uL    Absolute Eosinophils 0.0 0.0 - 0.7 10e3/uL    Absolute Basophils 0.0 0.0 - 0.2 10e3/uL    Absolute Immature Granulocytes 0.0 <=0.4 10e3/uL    Absolute NRBCs 0.0 10e3/uL           Electronically signed by    Flaca Solano MD                             Sincerely,        EDILMA Shirley

## 2022-02-10 NOTE — PROGRESS NOTES
[facility] GERIATRIC SERVICES       Patient Mary Mederos  MRN: [id number]        Reason for Visit     Chief Complaint   Patient presents with     RECHECK     Follow-up pain /low BP  Code Status     CPR/Full code     Assessment     Fall  Sacral fracture  Compression fracture T7  HTN WITH hypotension.  Hyperkalemia - RESOLVED  Electrolyte imbalance with low potassium and magnesium  Generalized weakness  leukopenia white count is 3.1  osteopenia on multiple doses of calcium supplementation    Plan     Pt is admitted to TCU for strengthening and rehab.  Given conservative treatment  Cont tlso  WBAT  Encourage compliance with TLSO  Pain management reviewed and she has 0 pain at rest  Blood pressures reviewed and orthostatics were done by therapy in my presence.  Systolic blood pressure 107/61 noted on laying down on standing after walking the highest blood pressure she had was 123/60  After 5 minutes blood pressure again dropped to 105/64  In light of her persistently low blood pressures will discontinue hydrochlorothiazide.  She can be allowed some permissive hypertension.  Monitor blood pressure trends.  Recheck labs as ordered  Follow-up with Ortho as scheduled.  Discharge planning reviewed she will be no longer discharging to her own home but probably moving in with one of her children          History     Patient is a very pleasant 81 year old female who is admitted to TCU  She admitted post fall does not recall how  She was admitted in hospital with sacral fracture  This will be treated conservatively  She was noted to have a T7 compression fracture  Saw ortho has been fitted with TLSO  Pain was optimized   She is on scheduled Tylenol  At baseline she reports that her pain is improved  Noted to be ambulating with a TLSO in place    BP remain very low and she is on 3 antihypertensives  Reports low BP at home.  She was recently taken off lisinopril and her HCTZ dose was reduced unfortunately continues to have  orthostatic drops in blood pressures with low blood pressures.    She reports that she is eating and drinking normally  Denies any discomfort but is not ambulating very well without any issues      Past Medical & Surgical History     HTN  ASHHD  HLD    Past Social History     Reviewed,  has an unknown smoking status. She has never used smokeless tobacco.   Does have wine occasionaly    Family History     Reviewed, and includes cad in [place] mother and father    Medication List   Post Discharge Medication Reconciliation Status: Post Discharge Medication Reconciliation Status: discharge medications reconciled, continue medications without change.  Current Outpatient Medications   Medication     acetaminophen (TYLENOL) 500 MG tablet     acetaminophen (TYLENOL) 650 MG CR tablet     apixaban ANTICOAGULANT (ELIQUIS) 2.5 MG tablet     atorvastatin (LIPITOR) 40 MG tablet     CALCIUM CITRATE PO     clopidogrel (PLAVIX) 75 MG tablet     diclofenac (VOLTAREN) 1 % topical gel     ezetimibe (ZETIA) 10 MG tablet     hydrochlorothiazide (HYDRODIURIL) 12.5 MG tablet     lidocaine (LIDODERM) 5 % patch     magnesium oxide (MAG-OX) 400 MG tablet     methocarbamol (ROBAXIN) 750 MG tablet     metoprolol tartrate (LOPRESSOR) 25 MG tablet     nitroGLYcerin (NITROSTAT) 0.4 MG sublingual tablet     ondansetron (ZOFRAN-ODT) 4 MG ODT tab     polyethylene glycol (MIRALAX) 17 g packet     raloxifene (EVISTA) 60 MG tablet     senna-docusate (SENOKOT-S/PERICOLACE) 8.6-50 MG tablet     simethicone (MYLICON) 80 MG chewable tablet     traMADol (ULTRAM) 50 MG tablet     Vitamin D3 (CHOLECALCIFEROL) 25 mcg (1000 units) tablet     No current facility-administered medications for this visit.          Allergies     Allergies   Allergen Reactions     Penicillins Rash       Review of Systems   A comprehensive review of 14 systems was done. Pertinent findings noted here and in history of present illness. All the rest negative.  Constitutional: Negative.   "Negative for fever, chills, she has  activity change, appetite change and fatigue.   BP are very low and pt states that is normal  HENT: Negative for congestion and facial swelling.    Eyes: Negative for photophobia, redness and visual disturbance.   Respiratory: Negative for cough and chest tightness.    Cardiovascular: Negative for chest pain, palpitations and leg swelling.   Gastrointestinal: Negative for nausea, diarrhea, constipation, blood in stool and abdominal distention.   Genitourinary: Negative.    Musculoskeletal: Negative. Having significant  pain at rest and not using any tramadol  Skin: Negative.    Neurological: Negative for dizziness, tremors, syncope, weakness, light-headedness and headaches.   Hematological: Does not bruise/bleed easily.   Psychiatric/Behavioral: Negative.        Physical Exam   /74   Pulse 102   Temp 97.5  F (36.4  C)   Resp 20   Ht 1.626 m (5' 4\")   Wt 47.6 kg (105 lb)   SpO2 98%   BMI 18.02 kg/m     Systolic blood pressure 107/61 noted on laying down on standing after walking the highest blood pressure she had was 123/60  After 5 minutes blood pressure again dropped to 105/64  Constitutional: Oriented to person, place, and time and appears well-developed.   HEENT:  Normocephalic and atraumatic.  Eyes: Conjunctivae and EOM are normal. Pupils are equal, round, and reactive to light. No discharge.  No scleral icterus. Nose normal. Mouth/Throat: Oropharynx is clear and moist. No oropharyngeal exudate.    NECK: Normal range of motion. Neck supple. No JVD present. No tracheal deviation present. No thyromegaly present.   CARDIOVASCULAR: Normal rate, regular rhythm and intact distal pulses.  Exam reveals no gallop and no friction rub.  Systolic murmur present.  PULMONARY: Effort normal and breath sounds normal. No respiratory distress.No Wheezing or rales.  ABDOMEN: Soft. Bowel sounds are normal. No distension and no mass.  There is no tenderness. There is no rebound and " no guarding. No HSM.  MUSCULOSKELETAL: Normal range of motion. No edema and no tenderness. Mild kyphosis, thorasic spine tenderness.  Has a tlso brace  LYMPH NODES: Has no cervical, supraclavicular, axillary and groin adenopathy.   NEUROLOGICAL: Alert and oriented to person, place, and time. No cranial nerve deficit.  Normal muscle tone. Coordination normal.   GENITOURINARY: Deferred exam.  SKIN: Skin is warm and dry. No rash noted. No erythema. No pallor.   EXTREMITIES: No cyanosis, no clubbing, no edema. No Deformity.  PSYCHIATRIC: Normal mood, affect and behavior.          Lab Results     Recent Results (from the past 240 hour(s))   Basic metabolic panel    Collection Time: 02/01/22  6:59 AM   Result Value Ref Range    Sodium 132 (L) 136 - 145 mmol/L    Potassium 3.0 (L) 3.5 - 5.0 mmol/L    Chloride 95 (L) 98 - 107 mmol/L    Carbon Dioxide (CO2) 28 22 - 31 mmol/L    Anion Gap 9 5 - 18 mmol/L    Urea Nitrogen 23 8 - 28 mg/dL    Creatinine 0.67 0.60 - 1.10 mg/dL    Calcium 9.3 8.5 - 10.5 mg/dL    Glucose 85 70 - 125 mg/dL    GFR Estimate 86 >60 mL/min/1.73m2   CBC with platelets    Collection Time: 02/01/22  6:59 AM   Result Value Ref Range    WBC Count 3.1 (L) 4.0 - 11.0 10e3/uL    RBC Count 3.70 (L) 3.80 - 5.20 10e6/uL    Hemoglobin 12.7 11.7 - 15.7 g/dL    Hematocrit 36.9 35.0 - 47.0 %     78 - 100 fL    MCH 34.3 (H) 26.5 - 33.0 pg    MCHC 34.4 31.5 - 36.5 g/dL    RDW 12.6 10.0 - 15.0 %    Platelet Count 245 150 - 450 10e3/uL   Magnesium    Collection Time: 02/01/22  6:59 AM   Result Value Ref Range    Magnesium 1.5 (L) 1.8 - 2.6 mg/dL   Potassium    Collection Time: 02/03/22  6:50 AM   Result Value Ref Range    Potassium 5.4 (H) 3.5 - 5.0 mmol/L   Potassium    Collection Time: 02/04/22  6:52 AM   Result Value Ref Range    Potassium 3.9 3.5 - 5.0 mmol/L   CBC with platelets and differential    Collection Time: 02/08/22  9:04 AM   Result Value Ref Range    WBC Count 4.2 4.0 - 11.0 10e3/uL    RBC Count 3.86  3.80 - 5.20 10e6/uL    Hemoglobin 13.7 11.7 - 15.7 g/dL    Hematocrit 39.9 35.0 - 47.0 %     (H) 78 - 100 fL    MCH 35.5 (H) 26.5 - 33.0 pg    MCHC 34.3 31.5 - 36.5 g/dL    RDW 12.9 10.0 - 15.0 %    Platelet Count 224 150 - 450 10e3/uL    % Neutrophils 66 %    % Lymphocytes 23 %    % Monocytes 9 %    % Eosinophils 1 %    % Basophils 1 %    % Immature Granulocytes 0 %    NRBCs per 100 WBC 0 <1 /100    Absolute Neutrophils 2.8 1.6 - 8.3 10e3/uL    Absolute Lymphocytes 1.0 0.8 - 5.3 10e3/uL    Absolute Monocytes 0.4 0.0 - 1.3 10e3/uL    Absolute Eosinophils 0.0 0.0 - 0.7 10e3/uL    Absolute Basophils 0.0 0.0 - 0.2 10e3/uL    Absolute Immature Granulocytes 0.0 <=0.4 10e3/uL    Absolute NRBCs 0.0 10e3/uL         Electronically signed by    Flaca Solano MD

## 2022-02-10 NOTE — LETTER
2/10/2022        RE: Mary Mederos   [facility] [address]        [facility] GERIATRIC SERVICES       Patient Mary Mederos  MRN: [id number]        Reason for Visit     Chief Complaint   Patient presents with     RECHECK     Follow-up pain /low BP  Code Status     CPR/Full code     Assessment     Fall  Sacral fracture  Compression fracture T7  HTN WITH hypotension.  Hyperkalemia - RESOLVED  Electrolyte imbalance with low potassium and magnesium  Generalized weakness  leukopenia white count is 3.1  osteopenia on multiple doses of calcium supplementation    Plan     Pt is admitted to TCU for strengthening and rehab.  Given conservative treatment  Cont tlso  WBAT  Encourage compliance with TLSO  Pain management reviewed and she has 0 pain at rest  Blood pressures reviewed and orthostatics were done by therapy in my presence.  Systolic blood pressure 107/61 noted on laying down on standing after walking the highest blood pressure she had was 123/60  After 5 minutes blood pressure again dropped to 105/64  In light of her persistently low blood pressures will discontinue hydrochlorothiazide.  She can be allowed some permissive hypertension.  Monitor blood pressure trends.  Recheck labs as ordered  Follow-up with Ortho as scheduled.  Discharge planning reviewed she will be no longer discharging to her own home but probably moving in with one of her children          History     Patient is a very pleasant 81 year old female who is admitted to TCU  She admitted post fall does not recall how  She was admitted in hospital with sacral fracture  This will be treated conservatively  She was noted to have a T7 compression fracture  Saw ortho has been fitted with TLSO  Pain was optimized   She is on scheduled Tylenol  At baseline she reports that her pain is improved  Noted to be ambulating with a TLSO in place    BP remain very low and she is on 3 antihypertensives  Reports low BP at home.  She was recently  taken off lisinopril and her HCTZ dose was reduced unfortunately continues to have orthostatic drops in blood pressures with low blood pressures.    She reports that she is eating and drinking normally  Denies any discomfort but is not ambulating very well without any issues      Past Medical & Surgical History     HTN  ASH  HLD    Past Social History     Reviewed,  has an unknown smoking status. She has never used smokeless tobacco.   Does have wine occasionaly    Family History     Reviewed, and includes cad in [place] mother and father    Medication List   Post Discharge Medication Reconciliation Status: Post Discharge Medication Reconciliation Status: discharge medications reconciled, continue medications without change.  Current Outpatient Medications   Medication     acetaminophen (TYLENOL) 500 MG tablet     acetaminophen (TYLENOL) 650 MG CR tablet     apixaban ANTICOAGULANT (ELIQUIS) 2.5 MG tablet     atorvastatin (LIPITOR) 40 MG tablet     CALCIUM CITRATE PO     clopidogrel (PLAVIX) 75 MG tablet     diclofenac (VOLTAREN) 1 % topical gel     ezetimibe (ZETIA) 10 MG tablet     hydrochlorothiazide (HYDRODIURIL) 12.5 MG tablet     lidocaine (LIDODERM) 5 % patch     magnesium oxide (MAG-OX) 400 MG tablet     methocarbamol (ROBAXIN) 750 MG tablet     metoprolol tartrate (LOPRESSOR) 25 MG tablet     nitroGLYcerin (NITROSTAT) 0.4 MG sublingual tablet     ondansetron (ZOFRAN-ODT) 4 MG ODT tab     polyethylene glycol (MIRALAX) 17 g packet     raloxifene (EVISTA) 60 MG tablet     senna-docusate (SENOKOT-S/PERICOLACE) 8.6-50 MG tablet     simethicone (MYLICON) 80 MG chewable tablet     traMADol (ULTRAM) 50 MG tablet     Vitamin D3 (CHOLECALCIFEROL) 25 mcg (1000 units) tablet     No current facility-administered medications for this visit.          Allergies     Allergies   Allergen Reactions     Penicillins Rash       Review of Systems   A comprehensive review of 14 systems was done. Pertinent findings noted here and in  "history of present illness. All the rest negative.  Constitutional: Negative.  Negative for fever, chills, she has  activity change, appetite change and fatigue.   BP are very low and pt states that is normal  HENT: Negative for congestion and facial swelling.    Eyes: Negative for photophobia, redness and visual disturbance.   Respiratory: Negative for cough and chest tightness.    Cardiovascular: Negative for chest pain, palpitations and leg swelling.   Gastrointestinal: Negative for nausea, diarrhea, constipation, blood in stool and abdominal distention.   Genitourinary: Negative.    Musculoskeletal: Negative. Having significant  pain at rest and not using any tramadol  Skin: Negative.    Neurological: Negative for dizziness, tremors, syncope, weakness, light-headedness and headaches.   Hematological: Does not bruise/bleed easily.   Psychiatric/Behavioral: Negative.        Physical Exam   /74   Pulse 102   Temp 97.5  F (36.4  C)   Resp 20   Ht 1.626 m (5' 4\")   Wt 47.6 kg (105 lb)   SpO2 98%   BMI 18.02 kg/m     Systolic blood pressure 107/61 noted on laying down on standing after walking the highest blood pressure she had was 123/60  After 5 minutes blood pressure again dropped to 105/64  Constitutional: Oriented to person, place, and time and appears well-developed.   HEENT:  Normocephalic and atraumatic.  Eyes: Conjunctivae and EOM are normal. Pupils are equal, round, and reactive to light. No discharge.  No scleral icterus. Nose normal. Mouth/Throat: Oropharynx is clear and moist. No oropharyngeal exudate.    NECK: Normal range of motion. Neck supple. No JVD present. No tracheal deviation present. No thyromegaly present.   CARDIOVASCULAR: Normal rate, regular rhythm and intact distal pulses.  Exam reveals no gallop and no friction rub.  Systolic murmur present.  PULMONARY: Effort normal and breath sounds normal. No respiratory distress.No Wheezing or rales.  ABDOMEN: Soft. Bowel sounds are " normal. No distension and no mass.  There is no tenderness. There is no rebound and no guarding. No HSM.  MUSCULOSKELETAL: Normal range of motion. No edema and no tenderness. Mild kyphosis, thorasic spine tenderness.  Has a tlso brace  LYMPH NODES: Has no cervical, supraclavicular, axillary and groin adenopathy.   NEUROLOGICAL: Alert and oriented to person, place, and time. No cranial nerve deficit.  Normal muscle tone. Coordination normal.   GENITOURINARY: Deferred exam.  SKIN: Skin is warm and dry. No rash noted. No erythema. No pallor.   EXTREMITIES: No cyanosis, no clubbing, no edema. No Deformity.  PSYCHIATRIC: Normal mood, affect and behavior.          Lab Results     Recent Results (from the past 240 hour(s))   Basic metabolic panel    Collection Time: 02/01/22  6:59 AM   Result Value Ref Range    Sodium 132 (L) 136 - 145 mmol/L    Potassium 3.0 (L) 3.5 - 5.0 mmol/L    Chloride 95 (L) 98 - 107 mmol/L    Carbon Dioxide (CO2) 28 22 - 31 mmol/L    Anion Gap 9 5 - 18 mmol/L    Urea Nitrogen 23 8 - 28 mg/dL    Creatinine 0.67 0.60 - 1.10 mg/dL    Calcium 9.3 8.5 - 10.5 mg/dL    Glucose 85 70 - 125 mg/dL    GFR Estimate 86 >60 mL/min/1.73m2   CBC with platelets    Collection Time: 02/01/22  6:59 AM   Result Value Ref Range    WBC Count 3.1 (L) 4.0 - 11.0 10e3/uL    RBC Count 3.70 (L) 3.80 - 5.20 10e6/uL    Hemoglobin 12.7 11.7 - 15.7 g/dL    Hematocrit 36.9 35.0 - 47.0 %     78 - 100 fL    MCH 34.3 (H) 26.5 - 33.0 pg    MCHC 34.4 31.5 - 36.5 g/dL    RDW 12.6 10.0 - 15.0 %    Platelet Count 245 150 - 450 10e3/uL   Magnesium    Collection Time: 02/01/22  6:59 AM   Result Value Ref Range    Magnesium 1.5 (L) 1.8 - 2.6 mg/dL   Potassium    Collection Time: 02/03/22  6:50 AM   Result Value Ref Range    Potassium 5.4 (H) 3.5 - 5.0 mmol/L   Potassium    Collection Time: 02/04/22  6:52 AM   Result Value Ref Range    Potassium 3.9 3.5 - 5.0 mmol/L   CBC with platelets and differential    Collection Time: 02/08/22   9:04 AM   Result Value Ref Range    WBC Count 4.2 4.0 - 11.0 10e3/uL    RBC Count 3.86 3.80 - 5.20 10e6/uL    Hemoglobin 13.7 11.7 - 15.7 g/dL    Hematocrit 39.9 35.0 - 47.0 %     (H) 78 - 100 fL    MCH 35.5 (H) 26.5 - 33.0 pg    MCHC 34.3 31.5 - 36.5 g/dL    RDW 12.9 10.0 - 15.0 %    Platelet Count 224 150 - 450 10e3/uL    % Neutrophils 66 %    % Lymphocytes 23 %    % Monocytes 9 %    % Eosinophils 1 %    % Basophils 1 %    % Immature Granulocytes 0 %    NRBCs per 100 WBC 0 <1 /100    Absolute Neutrophils 2.8 1.6 - 8.3 10e3/uL    Absolute Lymphocytes 1.0 0.8 - 5.3 10e3/uL    Absolute Monocytes 0.4 0.0 - 1.3 10e3/uL    Absolute Eosinophils 0.0 0.0 - 0.7 10e3/uL    Absolute Basophils 0.0 0.0 - 0.2 10e3/uL    Absolute Immature Granulocytes 0.0 <=0.4 10e3/uL    Absolute NRBCs 0.0 10e3/uL         Electronically signed by    Flaca Solano MD                             Sincerely,        EDILMA Shirley

## 2022-02-14 NOTE — PROGRESS NOTES
"[facility] GERIATRIC SERVICES  Chief Complaint   Patient presents with     SHIVAM     [place] Medical Record Number:  [id number]  Place of Service where encounter took place:  [facility] ([facility]) [[id number]]  Code Status:  No Order     HISTORY:      HPI:  Mary Mederos  is 81 year old (1940) undergoing physical and occupational therapy.   She is with past medical history hypertension, hyperlipidemia, atrial fibrillation currently on Eliquis, Per hospital records she was brought to the  ED by ambulance after she fell down 5-10 stairs at her home. Patient's variable alarm went off and alerted EMS. When EMS got to the scene her family that were living next door had lifted her off the floor and she was sitting in a chair.  On arrival in the ED patient was awake and alert. She denied any headache. C-collar was in place. She did complain of pain in both her upper back and lower back. Pain was worse with movement. She denies any shortness of breath. She states that she felt that she had stumbled. She denied any lightheadedness chest pain or palpitations prior to the fall. She was found to have a T7 fracture.    Today she is seen to review vital signs, labs, pain management, She denied shortness of breath cough congestion constipation or diarrhea.  TLSO when OOB.  Her labs were reviewed and her potassium is now stable at 3.9 down from 5.4.  She did have a low WBC of 3.1 however as of 2/8/2022 her white blood cell count is now normal at 4.2.  Weights have been stable over the last week.  She denied numbness tingling and reported that her pain \"is good\".      ALLERGIES:Penicillins    PAST MEDICAL HISTORY: History reviewed. No pertinent past medical history.    PAST SURGICAL HISTORY:   has no past surgical history on file.    FAMILY HISTORY: family history is not on file.    SOCIAL HISTORY:  has an unknown smoking status. She has never used smokeless tobacco.    ROS:  Constitutional: Negative for activity " "change, appetite change, fatigue and fever.   HENT: Negative for congestion.    Respiratory: Negative for cough, shortness of breath and wheezing.    Cardiovascular: Negative for chest pain and leg swelling. Atrial fibrillation   Gastrointestinal: Negative for abdominal distention, abdominal pain, constipation, diarrhea and nausea.   Genitourinary: Negative for dysuria.   Musculoskeletal: Negative for arthralgia.positive for back pain.   Skin: Negative for color change and wound.   Neurological: Negative for dizziness.   Psychiatric/Behavioral: Negative for agitation, behavioral problems and confusion.     Physical Exam:  Constitutional:       Appearance: Patient is well-developed.   HENT:      Head: Normocephalic.   Eyes:      Conjunctiva/sclera: Conjunctivae normal.   Neck:      Musculoskeletal: Normal range of motion.   Cardiovascular:      Rate and Rhythm: Normal rate and regular rhythm.      Heart sounds: Normal heart sounds. No murmur.   Pulmonary:      Effort: No respiratory distress.      Breath sounds: Normal breath sounds. No wheezing or rales.   Abdominal:      General: Bowel sounds are normal. There is no distension.      Palpations: Abdomen is soft.      Tenderness: There is no abdominal tenderness.   Musculoskeletal:       Normal range of motion.     TLSO brace when out of bed  Skin:General:        Skin is warm.   Neurological:         Mental Status: Patient is alert and oriented to person, place, and time.   Psychiatric:         Behavior: Behavior normal.     Vitals:/77   Pulse 71   Temp 96.8  F (36  C)   Resp 20   Ht 1.626 m (5' 4\")   Wt 47.6 kg (105 lb)   SpO2 97%   BMI 18.02 kg/m   and Body mass index is 18.02 kg/m .    Lab/Diagnostic data:   Recent Results (from the past 240 hour(s))   CBC with platelets and differential    Collection Time: 02/08/22  9:04 AM   Result Value Ref Range    WBC Count 4.2 4.0 - 11.0 10e3/uL    RBC Count 3.86 3.80 - 5.20 10e6/uL    Hemoglobin 13.7 11.7 - " 15.7 g/dL    Hematocrit 39.9 35.0 - 47.0 %     (H) 78 - 100 fL    MCH 35.5 (H) 26.5 - 33.0 pg    MCHC 34.3 31.5 - 36.5 g/dL    RDW 12.9 10.0 - 15.0 %    Platelet Count 224 150 - 450 10e3/uL    % Neutrophils 66 %    % Lymphocytes 23 %    % Monocytes 9 %    % Eosinophils 1 %    % Basophils 1 %    % Immature Granulocytes 0 %    NRBCs per 100 WBC 0 <1 /100    Absolute Neutrophils 2.8 1.6 - 8.3 10e3/uL    Absolute Lymphocytes 1.0 0.8 - 5.3 10e3/uL    Absolute Monocytes 0.4 0.0 - 1.3 10e3/uL    Absolute Eosinophils 0.0 0.0 - 0.7 10e3/uL    Absolute Basophils 0.0 0.0 - 0.2 10e3/uL    Absolute Immature Granulocytes 0.0 <=0.4 10e3/uL    Absolute NRBCs 0.0 10e3/uL       MEDICATIONS:     Review of your medicines          Accurate as of February 14, 2022 10:39 AM. If you have any questions, ask your nurse or doctor.            CONTINUE these medicines which have NOT CHANGED      Dose / Directions   * acetaminophen 650 MG CR tablet  Commonly known as: TYLENOL      Dose: 650 mg  Take 650 mg by mouth every 24 hours  Refills: 0     * acetaminophen 500 MG tablet  Commonly known as: TYLENOL      Dose: 1,000 mg  Take 1,000 mg by mouth 3 times daily  Refills: 0     apixaban ANTICOAGULANT 2.5 MG tablet  Commonly known as: ELIQUIS      Dose: 2.5 mg  Take 2.5 mg by mouth 2 times daily  Refills: 0     atorvastatin 40 MG tablet  Commonly known as: LIPITOR      Dose: 40 mg  Take 40 mg by mouth daily  Refills: 0     CALCIUM CITRATE PO      Dose: 600 mg  Take 600 mg by mouth daily  Refills: 0     clopidogrel 75 MG tablet  Commonly known as: PLAVIX      Dose: 75 mg  Take 75 mg by mouth daily  Refills: 0     diclofenac 1 % topical gel  Commonly known as: VOLTAREN      Apply topically every 12 hours  Refills: 0     ezetimibe 10 MG tablet  Commonly known as: ZETIA      Dose: 10 mg  Take 10 mg by mouth daily  Refills: 0     lidocaine 5 % patch  Commonly known as: LIDODERM      Dose: 1 patch  Place 1 patch onto the skin every 24 hours To  prevent lidocaine toxicity, patient should be patch free for 12 hrs daily.  Refills: 0     magnesium oxide 400 MG tablet  Commonly known as: MAG-OX      Dose: 400 mg  Take 400 mg by mouth daily  Refills: 0     methocarbamol 750 MG tablet  Commonly known as: ROBAXIN      Dose: 750 mg  Take 750 mg by mouth every 6 hours as needed for muscle spasms  Refills: 0     metoprolol tartrate 25 MG tablet  Commonly known as: LOPRESSOR      Dose: 25 mg  Take 25 mg by mouth 2 times daily  Refills: 0     nitroGLYcerin 0.4 MG sublingual tablet  Commonly known as: NITROSTAT      Dose: 0.4 mg  Place 0.4 mg under the tongue every 5 minutes as needed for chest pain For chest pain place 1 tablet under the tongue every 5 minutes for 3 doses. If symptoms persist 5 minutes after 1st dose call 911.  Refills: 0     ondansetron 4 MG ODT tab  Commonly known as: ZOFRAN-ODT      Dose: 4 mg  Take 4 mg by mouth every 6 hours as needed for nausea  Refills: 0     polyethylene glycol 17 g packet  Commonly known as: MIRALAX      Dose: 1 packet  Take 1 packet by mouth daily  Refills: 0     raloxifene 60 MG tablet  Commonly known as: EVISTA      Dose: 60 mg  Take 60 mg by mouth daily  Refills: 0     senna-docusate 8.6-50 MG tablet  Commonly known as: SENOKOT-S/PERICOLACE      Dose: 1 tablet  Take 1 tablet by mouth 2 times daily as needed for constipation And 2 tabs q 12 hours prn  Refills: 0     simethicone 80 MG chewable tablet  Commonly known as: MYLICON      Dose: 160 mg  Take 160 mg by mouth 2 times daily  Refills: 0     traMADol 50 MG tablet  Commonly known as: ULTRAM      Dose: 50 mg  Take 50 mg by mouth every 4 hours as needed for severe pain  Refills: 0     Vitamin D3 25 mcg (1000 units) tablet  Commonly known as: CHOLECALCIFEROL      Dose: 2 tablet  Take 2 tablets by mouth daily  Refills: 0         * This list has 2 medication(s) that are the same as other medications prescribed for you. Read the directions carefully, and ask your doctor or  other care provider to review them with you.                ASSESSMENT/PLAN  Encounter Diagnoses   Name Primary?     Closed fracture of sacrum with routine healing, unspecified portion of sacrum, subsequent encounter Yes     Physical deconditioning      Pain management      Osteoporosis patient with pathological T7 fracture. Continue calcium carbonate, calcium citrate, vitamin D, raloxifene    Pain management tramadol 50 mg every 4 hours as needed, lidocaine patch as scheduled, diclofenac gel as needed, extended release and extra strength Tylenol    Physical deconditioning PT OT    Atrial fibrillation with Toprol tartrate 25 mg twice daily, Eliquis 2.5 mg twice daily    HDL continue atorvastatin    Electronically signed by: Poornima Messina CNP

## 2022-02-14 NOTE — LETTER
"    2/14/2022        RE: Mary Mederos   [address]  [facility] [address]        [facility] GERIATRIC SERVICES  Chief Complaint   Patient presents with     SHIVAM     [place] Medical Record Number:  [id number]  Place of Service where encounter took place:  [facility] ([facility]) [[id number]]  Code Status:  No Order     HISTORY:      HPI:  Mary Mederos  is 81 year old (1940) undergoing physical and occupational therapy.   She is with past medical history hypertension, hyperlipidemia, atrial fibrillation currently on Eliquis, Per hospital records she was brought to the  ED by ambulance after she fell down 5-10 stairs at her home. Patient's variable alarm went off and alerted EMS. When EMS got to the scene her family that were living next door had lifted her off the floor and she was sitting in a chair.  On arrival in the ED patient was awake and alert. She denied any headache. C-collar was in place. She did complain of pain in both her upper back and lower back. Pain was worse with movement. She denies any shortness of breath. She states that she felt that she had stumbled. She denied any lightheadedness chest pain or palpitations prior to the fall. She was found to have a T7 fracture.    Today she is seen to review vital signs, labs, pain management, She denied shortness of breath cough congestion constipation or diarrhea.  TLSO when OOB.  Her labs were reviewed and her potassium is now stable at 3.9 down from 5.4.  She did have a low WBC of 3.1 however as of 2/8/2022 her white blood cell count is now normal at 4.2.  Weights have been stable over the last week.  She denied numbness tingling and reported that her pain \"is good\".      ALLERGIES:Penicillins    PAST MEDICAL HISTORY: History reviewed. No pertinent past medical history.    PAST SURGICAL HISTORY:   has no past surgical history on file.    FAMILY HISTORY: family history is not on file.    SOCIAL HISTORY:  has an unknown smoking status. She " "has never used smokeless tobacco.    ROS:  Constitutional: Negative for activity change, appetite change, fatigue and fever.   HENT: Negative for congestion.    Respiratory: Negative for cough, shortness of breath and wheezing.    Cardiovascular: Negative for chest pain and leg swelling. Atrial fibrillation   Gastrointestinal: Negative for abdominal distention, abdominal pain, constipation, diarrhea and nausea.   Genitourinary: Negative for dysuria.   Musculoskeletal: Negative for arthralgia.positive for back pain.   Skin: Negative for color change and wound.   Neurological: Negative for dizziness.   Psychiatric/Behavioral: Negative for agitation, behavioral problems and confusion.     Physical Exam:  Constitutional:       Appearance: Patient is well-developed.   HENT:      Head: Normocephalic.   Eyes:      Conjunctiva/sclera: Conjunctivae normal.   Neck:      Musculoskeletal: Normal range of motion.   Cardiovascular:      Rate and Rhythm: Normal rate and regular rhythm.      Heart sounds: Normal heart sounds. No murmur.   Pulmonary:      Effort: No respiratory distress.      Breath sounds: Normal breath sounds. No wheezing or rales.   Abdominal:      General: Bowel sounds are normal. There is no distension.      Palpations: Abdomen is soft.      Tenderness: There is no abdominal tenderness.   Musculoskeletal:       Normal range of motion.     TLSO brace when out of bed  Skin:General:        Skin is warm.   Neurological:         Mental Status: Patient is alert and oriented to person, place, and time.   Psychiatric:         Behavior: Behavior normal.     Vitals:/77   Pulse 71   Temp 96.8  F (36  C)   Resp 20   Ht 1.626 m (5' 4\")   Wt 47.6 kg (105 lb)   SpO2 97%   BMI 18.02 kg/m   and Body mass index is 18.02 kg/m .    Lab/Diagnostic data:   Recent Results (from the past 240 hour(s))   CBC with platelets and differential    Collection Time: 02/08/22  9:04 AM   Result Value Ref Range    WBC Count 4.2 4.0 - " 11.0 10e3/uL    RBC Count 3.86 3.80 - 5.20 10e6/uL    Hemoglobin 13.7 11.7 - 15.7 g/dL    Hematocrit 39.9 35.0 - 47.0 %     (H) 78 - 100 fL    MCH 35.5 (H) 26.5 - 33.0 pg    MCHC 34.3 31.5 - 36.5 g/dL    RDW 12.9 10.0 - 15.0 %    Platelet Count 224 150 - 450 10e3/uL    % Neutrophils 66 %    % Lymphocytes 23 %    % Monocytes 9 %    % Eosinophils 1 %    % Basophils 1 %    % Immature Granulocytes 0 %    NRBCs per 100 WBC 0 <1 /100    Absolute Neutrophils 2.8 1.6 - 8.3 10e3/uL    Absolute Lymphocytes 1.0 0.8 - 5.3 10e3/uL    Absolute Monocytes 0.4 0.0 - 1.3 10e3/uL    Absolute Eosinophils 0.0 0.0 - 0.7 10e3/uL    Absolute Basophils 0.0 0.0 - 0.2 10e3/uL    Absolute Immature Granulocytes 0.0 <=0.4 10e3/uL    Absolute NRBCs 0.0 10e3/uL       MEDICATIONS:     Review of your medicines          Accurate as of February 14, 2022 10:39 AM. If you have any questions, ask your nurse or doctor.            CONTINUE these medicines which have NOT CHANGED      Dose / Directions   * acetaminophen 650 MG CR tablet  Commonly known as: TYLENOL      Dose: 650 mg  Take 650 mg by mouth every 24 hours  Refills: 0     * acetaminophen 500 MG tablet  Commonly known as: TYLENOL      Dose: 1,000 mg  Take 1,000 mg by mouth 3 times daily  Refills: 0     apixaban ANTICOAGULANT 2.5 MG tablet  Commonly known as: ELIQUIS      Dose: 2.5 mg  Take 2.5 mg by mouth 2 times daily  Refills: 0     atorvastatin 40 MG tablet  Commonly known as: LIPITOR      Dose: 40 mg  Take 40 mg by mouth daily  Refills: 0     CALCIUM CITRATE PO      Dose: 600 mg  Take 600 mg by mouth daily  Refills: 0     clopidogrel 75 MG tablet  Commonly known as: PLAVIX      Dose: 75 mg  Take 75 mg by mouth daily  Refills: 0     diclofenac 1 % topical gel  Commonly known as: VOLTAREN      Apply topically every 12 hours  Refills: 0     ezetimibe 10 MG tablet  Commonly known as: ZETIA      Dose: 10 mg  Take 10 mg by mouth daily  Refills: 0     lidocaine 5 % patch  Commonly known as:  LIDODERM      Dose: 1 patch  Place 1 patch onto the skin every 24 hours To prevent lidocaine toxicity, patient should be patch free for 12 hrs daily.  Refills: 0     magnesium oxide 400 MG tablet  Commonly known as: MAG-OX      Dose: 400 mg  Take 400 mg by mouth daily  Refills: 0     methocarbamol 750 MG tablet  Commonly known as: ROBAXIN      Dose: 750 mg  Take 750 mg by mouth every 6 hours as needed for muscle spasms  Refills: 0     metoprolol tartrate 25 MG tablet  Commonly known as: LOPRESSOR      Dose: 25 mg  Take 25 mg by mouth 2 times daily  Refills: 0     nitroGLYcerin 0.4 MG sublingual tablet  Commonly known as: NITROSTAT      Dose: 0.4 mg  Place 0.4 mg under the tongue every 5 minutes as needed for chest pain For chest pain place 1 tablet under the tongue every 5 minutes for 3 doses. If symptoms persist 5 minutes after 1st dose call 911.  Refills: 0     ondansetron 4 MG ODT tab  Commonly known as: ZOFRAN-ODT      Dose: 4 mg  Take 4 mg by mouth every 6 hours as needed for nausea  Refills: 0     polyethylene glycol 17 g packet  Commonly known as: MIRALAX      Dose: 1 packet  Take 1 packet by mouth daily  Refills: 0     raloxifene 60 MG tablet  Commonly known as: EVISTA      Dose: 60 mg  Take 60 mg by mouth daily  Refills: 0     senna-docusate 8.6-50 MG tablet  Commonly known as: SENOKOT-S/PERICOLACE      Dose: 1 tablet  Take 1 tablet by mouth 2 times daily as needed for constipation And 2 tabs q 12 hours prn  Refills: 0     simethicone 80 MG chewable tablet  Commonly known as: MYLICON      Dose: 160 mg  Take 160 mg by mouth 2 times daily  Refills: 0     traMADol 50 MG tablet  Commonly known as: ULTRAM      Dose: 50 mg  Take 50 mg by mouth every 4 hours as needed for severe pain  Refills: 0     Vitamin D3 25 mcg (1000 units) tablet  Commonly known as: CHOLECALCIFEROL      Dose: 2 tablet  Take 2 tablets by mouth daily  Refills: 0         * This list has 2 medication(s) that are the same as other medications  prescribed for you. Read the directions carefully, and ask your doctor or other care provider to review them with you.                ASSESSMENT/PLAN  Encounter Diagnoses   Name Primary?     Closed fracture of sacrum with routine healing, unspecified portion of sacrum, subsequent encounter Yes     Physical deconditioning      Pain management      Osteoporosis patient with pathological T7 fracture. Continue calcium carbonate, calcium citrate, vitamin D, raloxifene    Pain management tramadol 50 mg every 4 hours as needed, lidocaine patch as scheduled, diclofenac gel as needed, extended release and extra strength Tylenol    Physical deconditioning PT OT    Atrial fibrillation with Toprol tartrate 25 mg twice daily, Eliquis 2.5 mg twice daily    HDL continue atorvastatin    Electronically signed by: Poornima Messina CNP        Sincerely,        Poornima Messina CNP

## 2022-02-17 NOTE — LETTER
2/17/2022        RE: Mary Mederos   [facility] [id number]        [facility] GERIATRIC SERVICES       Patient Mary Mederos  MRN: [id number]        Reason for Visit     Chief Complaint   Patient presents with     RECHECK     Follow-up pain /low BP  Code Status     CPR/Full code     Assessment     Fall  Sacral fracture  Compression fracture T7  HTN WITH hypotension.  Hyperkalemia - RESOLVED  Electrolyte imbalance with low potassium and magnesium  Generalized weakness  leukopenia white count is 3.1  osteopenia on multiple doses of calcium supplementation    Plan     Pt is admitted to TCU for strengthening and rehab.  Given conservative treatment  Cont tlso  WBAT  Encourage compliance with TLSO  She reports that her pain is managed at rest and has no breakthrough pain.  She is still having difficulty managing her TLSO.  Blood pressure control reviewed and she continues to run low blood pressures in spite of no longer being on hydrochlorothiazide and lisinopril.  Heart rates are less than 60.  We will decrease her metoprolol to XL 25 mg daily with hold parameters.  Continue to monitor blood pressure trends  Discharge plan is to go home with family      History     Patient is a very pleasant 81 year old female who is admitted to TCU  She admitted post fall does not recall how  She was admitted in hospital with sacral fracture  This will be treated conservatively  She was noted to have a T7 compression fracture  Saw ortho has been fitted with TLSO  Pain was optimized   Reports that her pain is managed well.  She still has difficulty managing her TLSO but otherwise doing well    BP remain very low and she is on 3 antihypertensives  Reports low BP at home.  Since then she has been taken off lisinopril.  HCTZ was discontinued with no worsening edema noted   weights have been stable    She reports that she is eating and drinking normally  Denies any discomfort but is not ambulating very well without  any issues      Past Medical & Surgical History     HTN  [facility]  HLD    Past Social History     Reviewed,  has an unknown smoking status. She has never used smokeless tobacco.   Does have wine occasionaly    Family History     Reviewed, and includes cad in [place] mother and father    Medication List   Post Discharge Medication Reconciliation Status: Post Discharge Medication Reconciliation Status: discharge medications reconciled, continue medications without change.  Current Outpatient Medications   Medication     acetaminophen (TYLENOL) 500 MG tablet     acetaminophen (TYLENOL) 650 MG CR tablet     apixaban ANTICOAGULANT (ELIQUIS) 2.5 MG tablet     atorvastatin (LIPITOR) 40 MG tablet     CALCIUM CITRATE PO     clopidogrel (PLAVIX) 75 MG tablet     diclofenac (VOLTAREN) 1 % topical gel     ezetimibe (ZETIA) 10 MG tablet     lidocaine (LIDODERM) 5 % patch     magnesium oxide (MAG-OX) 400 MG tablet     methocarbamol (ROBAXIN) 750 MG tablet     metoprolol tartrate (LOPRESSOR) 25 MG tablet     nitroGLYcerin (NITROSTAT) 0.4 MG sublingual tablet     ondansetron (ZOFRAN-ODT) 4 MG ODT tab     polyethylene glycol (MIRALAX) 17 g packet     raloxifene (EVISTA) 60 MG tablet     senna-docusate (SENOKOT-S/PERICOLACE) 8.6-50 MG tablet     simethicone (MYLICON) 80 MG chewable tablet     traMADol (ULTRAM) 50 MG tablet     Vitamin D3 (CHOLECALCIFEROL) 25 mcg (1000 units) tablet     No current facility-administered medications for this visit.          Allergies     Allergies   Allergen Reactions     Penicillins Rash       Review of Systems   A comprehensive review of 14 systems was done. Pertinent findings noted here and in history of present illness. All the rest negative.  Constitutional: Negative.  Negative for fever, chills, she has  activity change, appetite change and fatigue.   BP are very low and pt states that is normal  HENT: Negative for congestion and facial swelling.    Eyes: Negative for photophobia, redness and visual  "disturbance.   Respiratory: Negative for cough and chest tightness.    Cardiovascular: Negative for chest pain, palpitations and leg swelling.   Gastrointestinal: Negative for nausea, diarrhea, constipation, blood in stool and abdominal distention.   Genitourinary: Negative.    Musculoskeletal: Negative. Having significant  pain at rest and not using any tramadol  Skin: Negative.    Neurological: Negative for dizziness, tremors, syncope, weakness, light-headedness and headaches.   Hematological: Does not bruise/bleed easily.   Psychiatric/Behavioral: Negative.        Physical Exam   /73   Pulse 82   Temp 98.2  F (36.8  C)   Resp 20   Ht 1.626 m (5' 4\")   Wt 48.5 kg (107 lb)   SpO2 96%   BMI 18.37 kg/m     Systolic blood pressure 107/61 noted on laying down on standing after walking the highest blood pressure she had was 123/60  After 5 minutes blood pressure again dropped to 105/64  Constitutional: Oriented to person, place, and time and appears well-developed.   HEENT:  Normocephalic and atraumatic.  Eyes: Conjunctivae and EOM are normal. Pupils are equal, round, and reactive to light. No discharge.  No scleral icterus. Nose normal. Mouth/Throat: Oropharynx is clear and moist. No oropharyngeal exudate.    NECK: Normal range of motion. Neck supple. No JVD present. No tracheal deviation present. No thyromegaly present.   CARDIOVASCULAR: Normal rate, regular rhythm and intact distal pulses.  Exam reveals no gallop and no friction rub.  Systolic murmur present.  PULMONARY: Effort normal and breath sounds normal. No respiratory distress.No Wheezing or rales.  ABDOMEN: Soft. Bowel sounds are normal. No distension and no mass.  There is no tenderness. There is no rebound and no guarding. No HSM.  MUSCULOSKELETAL: Normal range of motion. No edema and no tenderness. Mild kyphosis, thorasic spine tenderness.  Has a tlso brace  LYMPH NODES: Has no cervical, supraclavicular, axillary and groin adenopathy. "   NEUROLOGICAL: Alert and oriented to person, place, and time. No cranial nerve deficit.  Normal muscle tone. Coordination normal.   GENITOURINARY: Deferred exam.  SKIN: Skin is warm and dry. No rash noted. No erythema. No pallor.   EXTREMITIES: No cyanosis, no clubbing, no edema. No Deformity.  PSYCHIATRIC: Normal mood, affect and behavior.          Lab Results     Recent Results (from the past 240 hour(s))   CBC with platelets and differential    Collection Time: 02/08/22  9:04 AM   Result Value Ref Range    WBC Count 4.2 4.0 - 11.0 10e3/uL    RBC Count 3.86 3.80 - 5.20 10e6/uL    Hemoglobin 13.7 11.7 - 15.7 g/dL    Hematocrit 39.9 35.0 - 47.0 %     (H) 78 - 100 fL    MCH 35.5 (H) 26.5 - 33.0 pg    MCHC 34.3 31.5 - 36.5 g/dL    RDW 12.9 10.0 - 15.0 %    Platelet Count 224 150 - 450 10e3/uL    % Neutrophils 66 %    % Lymphocytes 23 %    % Monocytes 9 %    % Eosinophils 1 %    % Basophils 1 %    % Immature Granulocytes 0 %    NRBCs per 100 WBC 0 <1 /100    Absolute Neutrophils 2.8 1.6 - 8.3 10e3/uL    Absolute Lymphocytes 1.0 0.8 - 5.3 10e3/uL    Absolute Monocytes 0.4 0.0 - 1.3 10e3/uL    Absolute Eosinophils 0.0 0.0 - 0.7 10e3/uL    Absolute Basophils 0.0 0.0 - 0.2 10e3/uL    Absolute Immature Granulocytes 0.0 <=0.4 10e3/uL    Absolute NRBCs 0.0 10e3/uL   Magnesium    Collection Time: 02/15/22  7:14 AM   Result Value Ref Range    Magnesium 1.8 1.8 - 2.6 mg/dL         Electronically signed by    Flaca Solano MD                             Sincerely,        EDILMA Shirley

## 2022-02-17 NOTE — PROGRESS NOTES
[facility] GERIATRIC SERVICES       Patient Mary Mederos  MRN: [id number]        Reason for Visit     Chief Complaint   Patient presents with     RECHECK     Follow-up pain /low BP  Code Status     CPR/Full code     Assessment     Fall  Sacral fracture  Compression fracture T7  HTN WITH hypotension.  Hyperkalemia - RESOLVED  Electrolyte imbalance with low potassium and magnesium  Generalized weakness  leukopenia white count is 3.1  osteopenia on multiple doses of calcium supplementation    Plan     Pt is admitted to TCU for strengthening and rehab.  Given conservative treatment  Cont tlso  WBAT  Encourage compliance with TLSO  She reports that her pain is managed at rest and has no breakthrough pain.  She is still having difficulty managing her TLSO.  Blood pressure control reviewed and she continues to run low blood pressures in spite of no longer being on hydrochlorothiazide and lisinopril.  Heart rates are less than 60.  We will decrease her metoprolol to XL 25 mg daily with hold parameters.  Continue to monitor blood pressure trends  Discharge plan is to go home with family      History     Patient is a very pleasant 81 year old female who is admitted to TCU  She admitted post fall does not recall how  She was admitted in hospital with sacral fracture  This will be treated conservatively  She was noted to have a T7 compression fracture  Saw ortho has been fitted with TLSO  Pain was optimized   Reports that her pain is managed well.  She still has difficulty managing her TLSO but otherwise doing well    BP remain very low and she is on 3 antihypertensives  Reports low BP at home.  Since then she has been taken off lisinopril.  HCTZ was discontinued with no worsening edema noted   weights have been stable    She reports that she is eating and drinking normally  Denies any discomfort but is not ambulating very well without any issues      Past Medical & Surgical History     HTN  [facility]  HLD    Past Social  History     Reviewed,  has an unknown smoking status. She has never used smokeless tobacco.   Does have wine occasionaly    Family History     Reviewed, and includes cad in [place] mother and father    Medication List   Post Discharge Medication Reconciliation Status: Post Discharge Medication Reconciliation Status: discharge medications reconciled, continue medications without change.  Current Outpatient Medications   Medication     acetaminophen (TYLENOL) 500 MG tablet     acetaminophen (TYLENOL) 650 MG CR tablet     apixaban ANTICOAGULANT (ELIQUIS) 2.5 MG tablet     atorvastatin (LIPITOR) 40 MG tablet     CALCIUM CITRATE PO     clopidogrel (PLAVIX) 75 MG tablet     diclofenac (VOLTAREN) 1 % topical gel     ezetimibe (ZETIA) 10 MG tablet     lidocaine (LIDODERM) 5 % patch     magnesium oxide (MAG-OX) 400 MG tablet     methocarbamol (ROBAXIN) 750 MG tablet     metoprolol tartrate (LOPRESSOR) 25 MG tablet     nitroGLYcerin (NITROSTAT) 0.4 MG sublingual tablet     ondansetron (ZOFRAN-ODT) 4 MG ODT tab     polyethylene glycol (MIRALAX) 17 g packet     raloxifene (EVISTA) 60 MG tablet     senna-docusate (SENOKOT-S/PERICOLACE) 8.6-50 MG tablet     simethicone (MYLICON) 80 MG chewable tablet     traMADol (ULTRAM) 50 MG tablet     Vitamin D3 (CHOLECALCIFEROL) 25 mcg (1000 units) tablet     No current facility-administered medications for this visit.          Allergies     Allergies   Allergen Reactions     Penicillins Rash       Review of Systems   A comprehensive review of 14 systems was done. Pertinent findings noted here and in history of present illness. All the rest negative.  Constitutional: Negative.  Negative for fever, chills, she has  activity change, appetite change and fatigue.   BP are very low and pt states that is normal  HENT: Negative for congestion and facial swelling.    Eyes: Negative for photophobia, redness and visual disturbance.   Respiratory: Negative for cough and chest tightness.   "  Cardiovascular: Negative for chest pain, palpitations and leg swelling.   Gastrointestinal: Negative for nausea, diarrhea, constipation, blood in stool and abdominal distention.   Genitourinary: Negative.    Musculoskeletal: Negative. Having significant  pain at rest and not using any tramadol  Skin: Negative.    Neurological: Negative for dizziness, tremors, syncope, weakness, light-headedness and headaches.   Hematological: Does not bruise/bleed easily.   Psychiatric/Behavioral: Negative.        Physical Exam   /73   Pulse 82   Temp 98.2  F (36.8  C)   Resp 20   Ht 1.626 m (5' 4\")   Wt 48.5 kg (107 lb)   SpO2 96%   BMI 18.37 kg/m     Systolic blood pressure 107/61 noted on laying down on standing after walking the highest blood pressure she had was 123/60  After 5 minutes blood pressure again dropped to 105/64  Constitutional: Oriented to person, place, and time and appears well-developed.   HEENT:  Normocephalic and atraumatic.  Eyes: Conjunctivae and EOM are normal. Pupils are equal, round, and reactive to light. No discharge.  No scleral icterus. Nose normal. Mouth/Throat: Oropharynx is clear and moist. No oropharyngeal exudate.    NECK: Normal range of motion. Neck supple. No JVD present. No tracheal deviation present. No thyromegaly present.   CARDIOVASCULAR: Normal rate, regular rhythm and intact distal pulses.  Exam reveals no gallop and no friction rub.  Systolic murmur present.  PULMONARY: Effort normal and breath sounds normal. No respiratory distress.No Wheezing or rales.  ABDOMEN: Soft. Bowel sounds are normal. No distension and no mass.  There is no tenderness. There is no rebound and no guarding. No HSM.  MUSCULOSKELETAL: Normal range of motion. No edema and no tenderness. Mild kyphosis, thorasic spine tenderness.  Has a tlso brace  LYMPH NODES: Has no cervical, supraclavicular, axillary and groin adenopathy.   NEUROLOGICAL: Alert and oriented to person, place, and time. No cranial " nerve deficit.  Normal muscle tone. Coordination normal.   GENITOURINARY: Deferred exam.  SKIN: Skin is warm and dry. No rash noted. No erythema. No pallor.   EXTREMITIES: No cyanosis, no clubbing, no edema. No Deformity.  PSYCHIATRIC: Normal mood, affect and behavior.          Lab Results     Recent Results (from the past 240 hour(s))   CBC with platelets and differential    Collection Time: 02/08/22  9:04 AM   Result Value Ref Range    WBC Count 4.2 4.0 - 11.0 10e3/uL    RBC Count 3.86 3.80 - 5.20 10e6/uL    Hemoglobin 13.7 11.7 - 15.7 g/dL    Hematocrit 39.9 35.0 - 47.0 %     (H) 78 - 100 fL    MCH 35.5 (H) 26.5 - 33.0 pg    MCHC 34.3 31.5 - 36.5 g/dL    RDW 12.9 10.0 - 15.0 %    Platelet Count 224 150 - 450 10e3/uL    % Neutrophils 66 %    % Lymphocytes 23 %    % Monocytes 9 %    % Eosinophils 1 %    % Basophils 1 %    % Immature Granulocytes 0 %    NRBCs per 100 WBC 0 <1 /100    Absolute Neutrophils 2.8 1.6 - 8.3 10e3/uL    Absolute Lymphocytes 1.0 0.8 - 5.3 10e3/uL    Absolute Monocytes 0.4 0.0 - 1.3 10e3/uL    Absolute Eosinophils 0.0 0.0 - 0.7 10e3/uL    Absolute Basophils 0.0 0.0 - 0.2 10e3/uL    Absolute Immature Granulocytes 0.0 <=0.4 10e3/uL    Absolute NRBCs 0.0 10e3/uL   Magnesium    Collection Time: 02/15/22  7:14 AM   Result Value Ref Range    Magnesium 1.8 1.8 - 2.6 mg/dL         Electronically signed by    Flaca Solano MD

## 2022-02-21 NOTE — PROGRESS NOTES
[facility] GERIATRIC SERVICES       Patient Mary Mederos  MRN: [id number]        Reason for Visit     Chief Complaint   Patient presents with     Discharge Summary Nursing Home     Follow-up pain /low BP  Code Status     CPR/Full code     Assessment     Fall  Sacral fracture  Compression fracture T7  HTN WITH hypotension.  Hyperkalemia - RESOLVED  Electrolyte imbalance with low potassium and magnesium  Generalized weakness  leukopenia white count is 3.1  osteopenia on multiple doses of calcium supplementation    History     Patient is a very pleasant 81 year old female who is admitted to TCU  She admitted post fall does not recall how  She was admitted in hospital with sacral fracture  This will be treated conservatively  She was noted to have a T7 compression fracture  Saw ortho has been fitted with TLSO  Pain was optimized   she is now on scheduled Tylenol and advised to stop that when she goes home.  She will not be going on tramadol as she has no longer been using it she reports her pain is managed.  Family is planning to do an outpatient follow-up with orthopedics soon    BP remain very low and she is on 3 antihypertensives  Reports low BP at home.  Since then she has been taken off lisinopril.  HCTZ was discontinued with no worsening edema noted  she is also on a lower dose of metoprolol with hold parameters.  She was advised that she should get a home blood pressure monitoring device and monitor her blood pressures and follow-up with her primary care   weights have been stable    She reports that she is eating and drinking normally  Denies any discomfort but is not ambulating very well without any issues      Past Medical & Surgical History     HTN  [facility]  HLD    Past Social History     Reviewed,  has an unknown smoking status. She has never used smokeless tobacco.   Does have wine occasionaly    Family History     Reviewed, and includes cad in [place] mother and father    Medication List   Post Discharge  Medication Reconciliation Status: Post Discharge Medication Reconciliation Status: discharge medications reconciled, continue medications without change.  Current Outpatient Medications   Medication     acetaminophen (TYLENOL) 500 MG tablet     acetaminophen (TYLENOL) 650 MG CR tablet     apixaban ANTICOAGULANT (ELIQUIS) 2.5 MG tablet     atorvastatin (LIPITOR) 40 MG tablet     CALCIUM CITRATE PO     clopidogrel (PLAVIX) 75 MG tablet     diclofenac (VOLTAREN) 1 % topical gel     ezetimibe (ZETIA) 10 MG tablet     lidocaine (LIDODERM) 5 % patch     magnesium oxide (MAG-OX) 400 MG tablet     metoprolol tartrate (LOPRESSOR) 25 MG tablet     nitroGLYcerin (NITROSTAT) 0.4 MG sublingual tablet     ondansetron (ZOFRAN-ODT) 4 MG ODT tab     polyethylene glycol (MIRALAX) 17 g packet     raloxifene (EVISTA) 60 MG tablet     senna-docusate (SENOKOT-S/PERICOLACE) 8.6-50 MG tablet     simethicone (MYLICON) 80 MG chewable tablet     traMADol (ULTRAM) 50 MG tablet     Vitamin D3 (CHOLECALCIFEROL) 25 mcg (1000 units) tablet     No current facility-administered medications for this visit.          Allergies     Allergies   Allergen Reactions     Penicillins Rash       Review of Systems   A comprehensive review of 14 systems was done. Pertinent findings noted here and in history of present illness. All the rest negative.  Constitutional: Negative.  Negative for fever, chills, she has  activity change, appetite change and fatigue.   BP are very low and pt states that is normal  HENT: Negative for congestion and facial swelling.    Eyes: Negative for photophobia, redness and visual disturbance.   Respiratory: Negative for cough and chest tightness.    Cardiovascular: Negative for chest pain, palpitations and leg swelling.   Gastrointestinal: Negative for nausea, diarrhea, constipation, blood in stool and abdominal distention.   Genitourinary: Negative.    Musculoskeletal: Negative. Having significant  pain at rest and not using any  tramadol  Skin: Negative.    Neurological: Negative for dizziness, tremors, syncope, weakness, light-headedness and headaches.   Hematological: Does not bruise/bleed easily.   Psychiatric/Behavioral: Negative.        Physical Exam   There were no vitals taken for this visit.   Systolic blood pressure 107/61 noted on laying down on standing after walking the highest blood pressure she had was 123/60  After 5 minutes blood pressure again dropped to 105/64  Constitutional: Oriented to person, place, and time and appears well-developed.   HEENT:  Normocephalic and atraumatic.  Eyes: Conjunctivae and EOM are normal. Pupils are equal, round, and reactive to light. No discharge.  No scleral icterus. Nose normal. Mouth/Throat: Oropharynx is clear and moist. No oropharyngeal exudate.    NECK: Normal range of motion. Neck supple. No JVD present. No tracheal deviation present. No thyromegaly present.   CARDIOVASCULAR: Normal rate, regular rhythm and intact distal pulses.  Exam reveals no gallop and no friction rub.  Systolic murmur present.  PULMONARY: Effort normal and breath sounds normal. No respiratory distress.No Wheezing or rales.  ABDOMEN: Soft. Bowel sounds are normal. No distension and no mass.  There is no tenderness. There is no rebound and no guarding. No HSM.  MUSCULOSKELETAL: Normal range of motion. No edema and no tenderness. Mild kyphosis, thorasic spine tenderness.  Has a tlso brace  LYMPH NODES: Has no cervical, supraclavicular, axillary and groin adenopathy.   NEUROLOGICAL: Alert and oriented to person, place, and time. No cranial nerve deficit.  Normal muscle tone. Coordination normal.   GENITOURINARY: Deferred exam.  SKIN: Skin is warm and dry. No rash noted. No erythema. No pallor.   EXTREMITIES: No cyanosis, no clubbing, no edema. No Deformity.  PSYCHIATRIC: Normal mood, affect and behavior.          Lab Results     Recent Results (from the past 240 hour(s))   Magnesium    Collection Time: 02/15/22  7:14  AM   Result Value Ref Range    Magnesium 1.8 1.8 - 2.6 mg/dL     MEDICAL EQUIPMENT NEEDS:  Walker     DISCHARGE PLAN/FACE TO FACE:  I certify that services are/were furnished while this patient was under the care of a physician and that a physician or an allowed non-physician practitioner (NPP), had a face-to-face encounter that meets the physician face-to-face encounter requirements. The encounter was in whole, or in part, related to the primary reason for home health. The patient is confined to his/her home and needs intermittent skilled nursing, physical therapy, speech-language pathology, or the continued need for occupational therapy. A plan of care has been established by a physician and is periodically reviewed by a physician.  Date of Face-to-Face Encounter:  2/22/2022     I certify that, based on my findings, the following services are medically necessary home health services: [facility] HHA/RN and PT/OT to evaluate and treat    My clinical findings support the need for the above skilled services because: patient will be discharging to home. Patient will need assistance with medication management and performing IADLs and ADLs effectively and safely.     Patient to re-establish plan of care with their PCP within 7 days after leaving TCU.   Advise outpatient follow-up with her primary care physician to review her blood pressures  outpatient follow-up with orthopedics as scheduled    Electronically signed by    Flaca Solano MD

## 2022-02-22 NOTE — LETTER
2/22/2022        RE: Mary Mederos   [address]  [facility] [address]        M [facility] GERIATRIC SERVICES       Patient Mary Mederos  MRN: [id number]        Reason for Visit     Chief Complaint   Patient presents with     Discharge Summary Nursing Home     Follow-up pain /low BP  Code Status     CPR/Full code     Assessment     Fall  Sacral fracture  Compression fracture T7  HTN WITH hypotension.  Hyperkalemia - RESOLVED  Electrolyte imbalance with low potassium and magnesium  Generalized weakness  leukopenia white count is 3.1  osteopenia on multiple doses of calcium supplementation    History     Patient is a very pleasant 81 year old female who is admitted to TCU  She admitted post fall does not recall how  She was admitted in hospital with sacral fracture  This will be treated conservatively  She was noted to have a T7 compression fracture  Saw ortho has been fitted with TLSO  Pain was optimized   she is now on scheduled Tylenol and advised to stop that when she goes home.  She will not be going on tramadol as she has no longer been using it she reports her pain is managed.  Family is planning to do an outpatient follow-up with orthopedics soon    BP remain very low and she is on 3 antihypertensives  Reports low BP at home.  Since then she has been taken off lisinopril.  HCTZ was discontinued with no worsening edema noted  she is also on a lower dose of metoprolol with hold parameters.  She was advised that she should get a home blood pressure monitoring device and monitor her blood pressures and follow-up with her primary care   weights have been stable    She reports that she is eating and drinking normally  Denies any discomfort but is not ambulating very well without any issues      Past Medical & Surgical History     HTN  ASHHD  HLD    Past Social History     Reviewed,  has an unknown smoking status. She has never used smokeless tobacco.   Does have wine occasionaly    Family History      Reviewed, and includes cad in [place] mother and father    Medication List   Post Discharge Medication Reconciliation Status: Post Discharge Medication Reconciliation Status: discharge medications reconciled, continue medications without change.  Current Outpatient Medications   Medication     acetaminophen (TYLENOL) 500 MG tablet     acetaminophen (TYLENOL) 650 MG CR tablet     apixaban ANTICOAGULANT (ELIQUIS) 2.5 MG tablet     atorvastatin (LIPITOR) 40 MG tablet     CALCIUM CITRATE PO     clopidogrel (PLAVIX) 75 MG tablet     diclofenac (VOLTAREN) 1 % topical gel     ezetimibe (ZETIA) 10 MG tablet     lidocaine (LIDODERM) 5 % patch     magnesium oxide (MAG-OX) 400 MG tablet     metoprolol tartrate (LOPRESSOR) 25 MG tablet     nitroGLYcerin (NITROSTAT) 0.4 MG sublingual tablet     ondansetron (ZOFRAN-ODT) 4 MG ODT tab     polyethylene glycol (MIRALAX) 17 g packet     raloxifene (EVISTA) 60 MG tablet     senna-docusate (SENOKOT-S/PERICOLACE) 8.6-50 MG tablet     simethicone (MYLICON) 80 MG chewable tablet     traMADol (ULTRAM) 50 MG tablet     Vitamin D3 (CHOLECALCIFEROL) 25 mcg (1000 units) tablet     No current facility-administered medications for this visit.          Allergies     Allergies   Allergen Reactions     Penicillins Rash       Review of Systems   A comprehensive review of 14 systems was done. Pertinent findings noted here and in history of present illness. All the rest negative.  Constitutional: Negative.  Negative for fever, chills, she has  activity change, appetite change and fatigue.   BP are very low and pt states that is normal  HENT: Negative for congestion and facial swelling.    Eyes: Negative for photophobia, redness and visual disturbance.   Respiratory: Negative for cough and chest tightness.    Cardiovascular: Negative for chest pain, palpitations and leg swelling.   Gastrointestinal: Negative for nausea, diarrhea, constipation, blood in stool and abdominal distention.   Genitourinary:  Negative.    Musculoskeletal: Negative. Having significant  pain at rest and not using any tramadol  Skin: Negative.    Neurological: Negative for dizziness, tremors, syncope, weakness, light-headedness and headaches.   Hematological: Does not bruise/bleed easily.   Psychiatric/Behavioral: Negative.        Physical Exam   There were no vitals taken for this visit.   Systolic blood pressure 107/61 noted on laying down on standing after walking the highest blood pressure she had was 123/60  After 5 minutes blood pressure again dropped to 105/64  Constitutional: Oriented to person, place, and time and appears well-developed.   HEENT:  Normocephalic and atraumatic.  Eyes: Conjunctivae and EOM are normal. Pupils are equal, round, and reactive to light. No discharge.  No scleral icterus. Nose normal. Mouth/Throat: Oropharynx is clear and moist. No oropharyngeal exudate.    NECK: Normal range of motion. Neck supple. No JVD present. No tracheal deviation present. No thyromegaly present.   CARDIOVASCULAR: Normal rate, regular rhythm and intact distal pulses.  Exam reveals no gallop and no friction rub.  Systolic murmur present.  PULMONARY: Effort normal and breath sounds normal. No respiratory distress.No Wheezing or rales.  ABDOMEN: Soft. Bowel sounds are normal. No distension and no mass.  There is no tenderness. There is no rebound and no guarding. No HSM.  MUSCULOSKELETAL: Normal range of motion. No edema and no tenderness. Mild kyphosis, thorasic spine tenderness.  Has a tlso brace  LYMPH NODES: Has no cervical, supraclavicular, axillary and groin adenopathy.   NEUROLOGICAL: Alert and oriented to person, place, and time. No cranial nerve deficit.  Normal muscle tone. Coordination normal.   GENITOURINARY: Deferred exam.  SKIN: Skin is warm and dry. No rash noted. No erythema. No pallor.   EXTREMITIES: No cyanosis, no clubbing, no edema. No Deformity.  PSYCHIATRIC: Normal mood, affect and behavior.          Lab Results      Recent Results (from the past 240 hour(s))   Magnesium    Collection Time: 02/15/22  7:14 AM   Result Value Ref Range    Magnesium 1.8 1.8 - 2.6 mg/dL     MEDICAL EQUIPMENT NEEDS:  Walker     DISCHARGE PLAN/FACE TO FACE:  I certify that services are/were furnished while this patient was under the care of a physician and that a physician or an allowed non-physician practitioner (NPP), had a face-to-face encounter that meets the physician face-to-face encounter requirements. The encounter was in whole, or in part, related to the primary reason for home health. The patient is confined to his/her home and needs intermittent skilled nursing, physical therapy, speech-language pathology, or the continued need for occupational therapy. A plan of care has been established by a physician and is periodically reviewed by a physician.  Date of Face-to-Face Encounter:  2/22/2022     I certify that, based on my findings, the following services are medically necessary home health services: C HHA/RN and PT/OT to evaluate and treat    My clinical findings support the need for the above skilled services because: patient will be discharging to home. Patient will need assistance with medication management and performing IADLs and ADLs effectively and safely.     Patient to re-establish plan of care with their PCP within 7 days after leaving TCU.   Advise outpatient follow-up with her primary care physician to review her blood pressures  outpatient follow-up with orthopedics as scheduled    Electronically signed by    Flaca Solano MD                             Sincerely,        EDILMA Shirley

## 2022-05-06 PROBLEM — I48.0 PAROXYSMAL A-FIB (H): Status: ACTIVE | Noted: 2021-11-03

## 2022-05-06 PROBLEM — Z79.01 ADEQUATE ANTICOAGULATION ON ANTICOAGULANT THERAPY: Status: ACTIVE | Noted: 2022-01-01

## 2022-05-06 PROBLEM — F03.90 DEMENTIA WITHOUT BEHAVIORAL DISTURBANCE, UNSPECIFIED DEMENTIA TYPE: Status: ACTIVE | Noted: 2022-01-01

## 2022-05-06 PROBLEM — R47.01 APHASIA: Status: ACTIVE | Noted: 2022-01-01

## 2022-05-06 NOTE — ED TRIAGE NOTES
Pt arrives to ED with son with c/o aphasia that first occrued a couple days ago which resolved but aphasia has now come back at 1030 this morning. Son states her speech was garbled and unable to get a word out or make any sense. Son states when she was in the car trying to get her seatbelt on pt could not figure it out and had her sunglasses in her hand instead of the seatbelt.  Pt is now able to talk clear but is disoriented to time and situation. Son states she has some dementia but she is still not at baseline. STROKE CODE TIER 1 CALLED. Blood sugar 97.      Triage Assessment     Row Name 05/06/22 1141       Triage Assessment (Adult)    Airway WDL WDL       Respiratory WDL    Respiratory WDL WDL       Skin Circulation/Temperature WDL    Skin Circulation/Temperature WDL WDL       Peripheral/Neurovascular WDL    Peripheral Neurovascular WDL WDL       Cognitive/Neuro/Behavioral WDL    Cognitive/Neuro/Behavioral WDL X    Level of Consciousness alert    Arousal Level opens eyes spontaneously    Orientation disoriented to;time;situation    Speech word-finding difficulty;spontaneous    Mood/Behavior cooperative;calm       Pupils (CN II)    Pupil PERRLA yes       Michael Coma Scale    Best Eye Response 4-->(E4) spontaneous    Best Motor Response 6-->(M6) obeys commands    Best Verbal Response 4-->(V4) confused  oriented to person and place     Wilber Coma Scale Score 14

## 2022-05-06 NOTE — ED PROVIDER NOTES
EMERGENCY DEPARTMENT ENCOUNTER      NAME: Mary Mederos  AGE: 82 year old female  YOB: 1940  MRN: [id number]  EVALUATION DATE & TIME: No admission date for patient encounter.    PCP: Jenny Bond    ED PROVIDER: Denis Hernandez M.D.    Chief Complaint   Patient presents with     Aphasia       FINAL IMPRESSION:  1. Aphasia    2. DNR (do not resuscitate)    3. Dementia without behavioral disturbance, unspecified dementia type (H)    4. Adequate anticoagulation on anticoagulant therapy    5. Paroxysmal atrial fibrillation (H)        ED COURSE & MEDICAL DECISION MAKING:    Pertinent Labs & Imaging studies personally reviewed and interpreted by me. (See chart for details)  11:43 Patient seen and examined, prior records reviewed.  Differential diagnosis includes but not limited to acute CVA, TIA, intracranial hemorrhage, electrolyte disturbance, urinary tract infection.  Patient presents with word finding difficulty that started about 10:30 AM.  Much improved per son and really not noticeable on initial exam.  Patient's not quite back to baseline, stroke code initiated.  11:47 AM Tier 1 stroke coded initiated.   11:54 AM I spoke with Phyllis stroke neurology.  12:15 PM I spoke with radiology. CTA negative. 12:18 PM I spoke with stroke neurology.  Will de-escalate stroke code.   12:36 PM patient rechecked, word finding difficulties significantly worse than on initial arrival.  Will discuss with neurology again.  12:42 PM  Discussed with neurology. Recommends MR brain.  Patient is not a tPA candidate due to being on Eliquis.  12:51 PM I discussed the patient with Dr. Schroeder from the hospitalist service who agrees to admit the patient.            At the conclusion of the encounter I discussed the results of all of the tests and the disposition. The questions were answered. The patient or family acknowledged understanding and was agreeable with the care plan.     PROCEDURES:    Procedures    MEDICATIONS GIVEN IN THE EMERGENCY:  Medications   iopamidol (ISOVUE-370) solution 100 mL (75 mLs Intravenous Given 5/6/22 1208)       NEW PRESCRIPTIONS STARTED AT TODAY'S ER VISIT  New Prescriptions    No medications on file       =================================================================    HPI    Patient information was obtained from: patient, son      Mary Mederos is a 82 year old female with a pertinent history of AFIB, CAD s/p MI and coronary stent placement, on Plavix and Eliquis, HTN, HLD, reactive airway disease, and dementia who presents to this ED via private car with family for evaluation of aphasia.     Patient presents with son after an episode of reported aphasia while at an orthopedic appointment at 10:30 AM today (5/6/2022; ~1.25 hours prior to arrival). Son states the patient's speech became garbled and she was unable to form words. Son notes the worst of the aphasia was within the first 15 minutes of the episode. Upon initial evaluation in the ED, son states her speech has improved compared to aphasic onset at 10:30 though he is not sure if it is back to baseline. Son notes her speech still seems slow and she is a bit confused. Does have baseline dementia but son states her confusion seems slightly worse than her baseline. She is still able to recognize objects. Of note, son reports she had a similar episode of aphasia about 5 or 6 days ago. That previous episode resolved after a few minutes. Patient denies numbness, weakness, or other sensory changes. Patient denies any pain related complaints. No chest pain, back pain, headache, neck pain, abdominal pain. No other complaints or concerns expressed at this time. Patient lives at home with her son and daughter-in-law.       REVIEW OF SYSTEMS   Review of Systems   HENT: Negative for trouble swallowing.    Cardiovascular: Negative for chest pain.   Gastrointestinal: Negative for abdominal pain.   Musculoskeletal: Negative  for back pain and neck pain.   Neurological: Positive for speech difficulty. Negative for facial asymmetry, weakness, numbness and headaches.   Hematological: Bruises/bleeds easily (on Eliquis and Plavix).   Psychiatric/Behavioral: Positive for confusion.      All other systems reviewed and negative    PAST MEDICAL HISTORY:  Past Medical History:   Diagnosis Date     Afib (H)      CAD S/P percutaneous coronary angioplasty      Dyslipidemia      Hypertension      Myocardial infarction (H)      Osteoporosis      Reactive airway disease      Uncomplicated asthma        PAST SURGICAL HISTORY:  Past Surgical History:   Procedure Laterality Date     APPENDECTOMY       COLONOSCOPY       CORONARY ANGIOPLASTY         CURRENT MEDICATIONS:    No current facility-administered medications for this encounter.     Current Outpatient Medications   Medication     acetaminophen (TYLENOL) 500 MG tablet     acetaminophen (TYLENOL) 650 MG CR tablet     apixaban ANTICOAGULANT (ELIQUIS) 2.5 MG tablet     atorvastatin (LIPITOR) 40 MG tablet     CALCIUM CITRATE PO     clopidogrel (PLAVIX) 75 MG tablet     diclofenac (VOLTAREN) 1 % topical gel     ezetimibe (ZETIA) 10 MG tablet     hydrochlorothiazide (HYDRODIURIL) 50 MG tablet     lidocaine (LIDODERM) 5 % patch     magnesium oxide (MAG-OX) 400 MG tablet     metoprolol tartrate (LOPRESSOR) 25 MG tablet     nitroGLYcerin (NITROSTAT) 0.4 MG sublingual tablet     ondansetron (ZOFRAN-ODT) 4 MG ODT tab     polyethylene glycol (MIRALAX) 17 g packet     raloxifene (EVISTA) 60 MG tablet     senna-docusate (SENOKOT-S/PERICOLACE) 8.6-50 MG tablet     simethicone (MYLICON) 80 MG chewable tablet     Vitamin D3 (CHOLECALCIFEROL) 25 mcg (1000 units) tablet       ALLERGIES:  Allergies   Allergen Reactions     Penicillins Rash       FAMILY HISTORY:  History reviewed. No pertinent family history.    SOCIAL HISTORY:   Social History     Socioeconomic History     Marital status:    Tobacco Use      "Smoking status: Unknown If Ever Smoked     Smokeless tobacco: Never Used       VITALS:  BP (!) 154/81   Pulse 79   Temp 97.9  F (36.6  C) (Temporal)   Resp 16   Ht 1.626 m (5' 4\")   Wt 48.5 kg (107 lb)   SpO2 98%   BMI 18.37 kg/m      PHYSICAL EXAM:  Physical Exam  Vitals and nursing note reviewed.   Constitutional:       Appearance: Normal appearance.   HENT:      Head: Normocephalic and atraumatic.      Right Ear: External ear normal.      Left Ear: External ear normal.      Nose: Nose normal.      Mouth/Throat:      Mouth: Mucous membranes are moist.   Eyes:      Extraocular Movements: Extraocular movements intact.      Conjunctiva/sclera: Conjunctivae normal.      Pupils: Pupils are equal, round, and reactive to light.   Cardiovascular:      Rate and Rhythm: Normal rate and regular rhythm.   Pulmonary:      Effort: Pulmonary effort is normal.      Breath sounds: Normal breath sounds. No wheezing or rales.   Abdominal:      General: Abdomen is flat. There is no distension.      Palpations: Abdomen is soft.      Tenderness: There is no abdominal tenderness. There is no guarding.   Musculoskeletal:         General: Normal range of motion.      Cervical back: Normal range of motion and neck supple.      Right lower leg: No edema.      Left lower leg: No edema.   Lymphadenopathy:      Cervical: No cervical adenopathy.   Skin:     General: Skin is warm and dry.   Neurological:      General: No focal deficit present.      Mental Status: She is alert.      Cranial Nerves: No cranial nerve deficit.      Comments: No gross focal neurologic deficits. Oriented to person and place.    Psychiatric:         Mood and Affect: Mood normal.         Behavior: Behavior normal.         Thought Content: Thought content normal.            National [employer] Stroke Scale  Exam Interval: Baseline   Score    Level of consciousness: (0)   Alert, keenly responsive    LOC questions: (0)   Answers both questions correctly    " LOC commands: (0)   Performs both tasks correctly    Best gaze: (0)   Normal    Visual: (0)   No visual loss    Facial palsy: (0)   Normal symmetrical movements    Motor arm (left): (0)   No drift    Motor arm (right): (0)   No drift    Motor leg (left): (0)   No drift    Motor leg (right): (0)   No drift    Limb ataxia: (0)   Absent    Sensory: (0)   Normal- no sensory loss    Best language: (1)   Mild to moderate aphasia    Dysarthria: (0)   Normal    Extinction and inattention: (0)   No abnormality        Total Score:  1       LAB:  All pertinent labs reviewed and interpreted.  Results for orders placed or performed during the hospital encounter of 05/06/22   CTA Head Neck with Contrast    Impression    IMPRESSION:   HEAD CT:  1.  No CT evidence for acute intracranial process.  2.  Brain atrophy and presumed chronic microvascular ischemic changes as above.    HEAD CTA:   1.  No significant stenosis, aneurysm, or high flow vascular malformation identified.  2.  Variant [place] anatomy as above.    NECK CTA:  1.  Approximately 10% stenosis of the left ICA origin based on NASCET criteria.    Imaging findings were discussed with Dr. Hernandez of the clinical service at 12:17 PM on 5/6/2022.   Basic metabolic panel   Result Value Ref Range    Sodium 138 136 - 145 mmol/L    Potassium 3.7 3.5 - 5.0 mmol/L    Chloride 97 (L) 98 - 107 mmol/L    Carbon Dioxide (CO2) 27 22 - 31 mmol/L    Anion Gap 14 5 - 18 mmol/L    Urea Nitrogen 19 8 - 28 mg/dL    Creatinine 0.85 0.60 - 1.10 mg/dL    Calcium 10.8 (H) 8.5 - 10.5 mg/dL    Glucose 99 70 - 125 mg/dL    GFR Estimate 67 >60 mL/min/1.73m2   Result Value Ref Range    INR 1.52 (H) 0.85 - 1.15   Partial thromboplastin time   Result Value Ref Range    aPTT 28 22 - 38 Seconds   Result Value Ref Range    Troponin I 0.04 0.00 - 0.29 ng/mL   Glucose by meter   Result Value Ref Range    GLUCOSE BY METER POCT 97 70 - 99 mg/dL   CBC with platelets and differential   Result Value  Ref Range    WBC Count 5.5 4.0 - 11.0 10e3/uL    RBC Count 3.92 3.80 - 5.20 10e6/uL    Hemoglobin 13.6 11.7 - 15.7 g/dL    Hematocrit 40.1 35.0 - 47.0 %     (H) 78 - 100 fL    MCH 34.7 (H) 26.5 - 33.0 pg    MCHC 33.9 31.5 - 36.5 g/dL    RDW 12.1 10.0 - 15.0 %    Platelet Count 233 150 - 450 10e3/uL    % Neutrophils 62 %    % Lymphocytes 25 %    % Monocytes 10 %    % Eosinophils 1 %    % Basophils 1 %    % Immature Granulocytes 1 %    NRBCs per 100 WBC 0 <1 /100    Absolute Neutrophils 3.5 1.6 - 8.3 10e3/uL    Absolute Lymphocytes 1.4 0.8 - 5.3 10e3/uL    Absolute Monocytes 0.6 0.0 - 1.3 10e3/uL    Absolute Eosinophils 0.1 0.0 - 0.7 10e3/uL    Absolute Basophils 0.0 0.0 - 0.2 10e3/uL    Absolute Immature Granulocytes 0.0 <=0.4 10e3/uL    Absolute NRBCs 0.0 10e3/uL   Creatinine POCT   Result Value Ref Range    Creatinine POCT 0.7 0.6 - 1.1 mg/dL    GFR, ESTIMATED POCT >60 >60 mL/min/1.73m2   ECG 12-LEAD WITH MUSE (LHE)   Result Value Ref Range    Systolic Blood Pressure  mmHg    Diastolic Blood Pressure  mmHg    Ventricular Rate 76 BPM    Atrial Rate 76 BPM    DC Interval 136 ms    QRS Duration 82 ms     ms    QTc 459 ms    P Axis 64 degrees    R AXIS 4 degrees    T Axis 75 degrees    Interpretation ECG       Sinus rhythm with frequent Premature ventricular complexes  Otherwise normal ECG  No previous ECGs available  Confirmed by SEE ED PROVIDER NOTE FOR, ECG INTERPRETATION ([id number]),  UGO BANUELOS ([id number]) on 5/6/2022 12:19:04 PM         RADIOLOGY:  Reviewed all pertinent imaging. Please see official radiology report.  CTA Head Neck with Contrast   Final Result   IMPRESSION:    HEAD CT:   1.  No CT evidence for acute intracranial process.   2.  Brain atrophy and presumed chronic microvascular ischemic changes as above.      HEAD CTA:    1.  No significant stenosis, aneurysm, or high flow vascular malformation identified.   2.  Variant [place] of [place] anatomy as above.      NECK CTA:   1.   Approximately 10% stenosis of the left ICA origin based on NASCET criteria.      Imaging findings were discussed with Dr. Hernandez of the clinical service at 12:17 PM on 5/6/2022.      MR Brain w/o & w Contrast    (Results Pending)     EKG:    Performed at: 11:48 AM  Impression: Sinus rhythm with frequent PVCs  Rate: 76  Rhythm: Sinus with PVCs  Axis: Normal  OR Interval: 136  QRS Interval: 82  QTc Interval: 459  ST Changes: No acute ischemic changes  Comparison: No prior    I have independently reviewed and interpreted the EKG(s) documented above.    I, Yo Reyna, am serving as a scribe to document services personally performed by Dr. Hernandez based on my observation and the provider's statements to me. I, Denis Hernandez MD attest that Yo Reyna is acting in a scribe capacity, has observed my performance of the services and has documented them in accordance with my direction.    Denis Hernandez M.D.  Emergency Medicine  [facility] EMERGENCY ROOM  [address] [facility] [address]  [phone]  Dept: [phone]     Denis Hernandez MD  05/06/22 1126

## 2022-05-06 NOTE — CONSULTS
"      [facility]    Stroke Telephone Note    I was called by Denis Hernandez on 05/06/22 regarding patient Mary Mederos. The patient is a 82 year old female with past medical history significant for HTN, HLD, CAD, dementia, and atrial fibrillation. She is on Eliquis and Plavix. She was brought to the ED for evaluation of aphasia. She was at an appointment this morning when she developed confusion and \"garbled\" speech. Son reports to ED that she was unable to say words. She is nearly back to baseline in the ED. She reportedly had a similar episode of language impairment 1 week ago. She is noted to have fluctuation in her language in the ED.    Stroke Code Data (for stroke code without tele)  Stroke code activated 05/06/22   1151   Stroke provider first response           05/06/22   1153   Last known normal 05/06/22   1020        Time of discovery   (or onset of symptoms) 05/06/22   1030   Head CT read by Stroke Neuro Dr/Provider 05/06/22   1202   Was stroke code de-escalated? Yes 05/06/22 1218          Imaging Findings   HEAD CT:  1.  No CT evidence for acute intracranial process.  2.  Brain atrophy and presumed chronic microvascular ischemic changes as above.     HEAD CTA:   1.  No significant stenosis, aneurysm, or high flow vascular malformation identified.  2.  Variant [place] of [place] anatomy as above.     NECK CTA:  1.  Approximately 10% stenosis of the left ICA origin based on NASCET criteria.    Intravenous Thrombolysis  Not given due to:   - DOAC dose within 48 hours or INR > 1.7    Endovascular Treatment  Not initiated due to absence of proximal vessel occlusion    Impression  Transient aphasia and confusion. Possible TIA, consideration for seizure.     Recommendations   - Please place stroke neurology consult on admission  - Neurochecks and Vital Signs every 4 hours   - Statin: high intensity statin   - MRI Brain with and without contrast  - TTE (with Bubble Study if age " "60 yrs or less)  - 24-hour Telemetry  - Bedside Glucose Monitoring  - A1c, Lipid Panel  - PT/OT/SLP  - Stroke Education  - Euthermia, Euglycemia    My recommendations are based on the information provided over the phone by Mary Mederos's in-person providers. They are not intended to replace the clinical judgment of her in-person providers. I was not requested to personally see or examine the patient at this time.    LACEY Brown, [facility]  Neurology  To page me or covering stroke neurology team member, click here: AMCOM   Choose \"On Call\" tab at top, then search dropdown box for \"Neurology Adult\", select location, press Enter, then look for stroke/neuro ICU/telestroke.    Billing:  Stroke Code: I have personally spent a total of 40 minutes providing care supervising this patient's stroke code activation.  I personally reviewed all lab values and radiology images.        "

## 2022-05-06 NOTE — H&P
[facility] MEDICINE ADMISSION HISTORY AND PHYSICAL     Assessment & Plan      Mary Mederos is a 82 year old old female with history of CAD with history of stents, hypertension, dyslipidemia, osteoporosis, dementia presented with difficulty with the speech.    Aphasia: Transient, resolved.  - CT Head and CTA head and neck with no acute pathology , UA contaminated mild abnormality   - cause? Patient already on plavix, Eliquis, ezetimibe and Lipitor.  - MR Brain   - Tele  -echocardiogram  - PT/OT   - not a tPA candidate re: on eliquis     Essential HTN   - BP elevated but improved   - continue hydrochlorothiazide and metoprolol    Paroxysmal atrial fibrillation  -EKG with the NSR, PACs  -Continue Eliquis  -Continue metoprolol    Dyslipidemia  -Continue ezetimibe and Lipitor    Hypercalcemia  -Likely related to calcium supplements  -Discontinue oral calcium  -Check I PTH and repeat BMP in the a.m.    Abnormal UA  Positive for leukocyte esterase, only 9 WBCs, + mucus cells suggest contamination  No need for treatment in the absence of symptoms    Osteoporosis  -Continue raloxifene    Elevated MCV  Check B12 and folate level       Clinically Significant Risk Factors Present on Admission          # Hypercalcemia: Ca = 10.8 mg/dL (Ref range: 8.5 - 10.5 mg/dL) and/or iCa = N/A on admission, will monitor as appropriate      #Long-term use anticoagulant: Home medication list includes an anticoagulant medication  #Long-term use anti-platelet home medication list includes an antiplatelet medication         DVTP: Direct Oral Anticagulants   Code Status: No Order  Disposition: Observation   Expected LOS( including midnights in ER): 1 day  Expected Discharge Destination: Home   Goals for the hospitalization: Monitoring for any neurological changes         Disposition Plan   Expected Discharge: 05/07/2022   Anticipated discharge location:  Awaiting care coordination huddle       The  patient's care was discussed with the Patient and Patient's Family.    Lois Schroeder MD  [facility]  Securely message with the [employer] Web Console (learn more here)  Text page via [employer] Paging/Directory         Chief Complaint  difficulty with the speech   History is obtained from the patient  Son  HISTORY     Mary Mederos is a 82 year old old female with PMH documented above p/w difficulty with his speech.  Patient had a T7, sacral fracture after a fall earlier in the year, was admitted to the hospital in [place].  Patient had a cognitive evaluation at that time and was recommended to have a 24-hour supervision.  Patient currently resides with the son and daughter.  Had an episode of garbled speech/difficulty with his speech about 3 days ago that resolved.  Patient was brought to an orthopedic appointment this morning and had garbled speech, could not find words and when the words were spoken those were inappropriate for situation.  Patient was seen by orthopedic PA and was directed to the emergency room.  Patient had a slow recovery back to the baseline.  Patient has difficulty recalling things.  Reports that she is feeling fine, denies any double vision, sore throat, weakness in any part of the body, paresthesias.  She walks with the help of walker.  She has been weak lately and has had a slow gait.  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 No pertinent family history.     Social History      Social History     Tobacco Use     Smoking status: Former Smoker     Smokeless tobacco: Never Used   Substance Use Topics     Alcohol use: Yes     Allergies     Allergies   Allergen Reactions     Penicillins Rash     Prior to Admission Medications      Prior to Admission Medications   Prescriptions Last Dose Informant Patient Reported? Taking?   Vitamin D3 (CHOLECALCIFEROL) 25 mcg (1000 units) tablet 5/6/2022 at Unknown time  Yes Yes   Sig: Take 50 mcg by mouth daily   acetaminophen (TYLENOL) 500 MG tablet Unknown at Unknown time  Yes Yes   Sig: Take 1,000 mg by mouth every 6 hours as needed for mild pain   apixaban ANTICOAGULANT (ELIQUIS) 2.5 MG tablet 5/6/2022 at Unknown time  Yes Yes   Sig: Take 2.5 mg by mouth 2 times daily   atorvastatin (LIPITOR) 40 MG tablet 5/5/2022 at Unknown time  Yes Yes   Sig: Take 40 mg by mouth At Bedtime   calcium citrate 250 MG TABS 5/6/2022 at Unknown time  Yes Yes   Sig: Take 1 tablet by mouth daily   clopidogrel (PLAVIX) 75 MG tablet 5/6/2022 at Unknown time  Yes Yes   Sig: Take 75 mg by mouth daily   ezetimibe (ZETIA) 10 MG tablet 5/6/2022 at Unknown time  Yes Yes   Sig: Take 10 mg by mouth daily   hydrochlorothiazide (HYDRODIURIL) 50 MG tablet 5/6/2022 at Unknown time  Yes Yes   Sig: Take 50 mg by mouth daily   magnesium oxide (MAG-OX) 400 MG tablet 5/6/2022 at Unknown time  Yes Yes   Sig: Take 400 mg by mouth daily   metoprolol tartrate (LOPRESSOR) 25 MG tablet 5/6/2022 at Unknown time  Yes Yes   Sig: Take 25 mg by mouth 2 times daily   nitroGLYcerin (NITROSTAT) 0.4 MG sublingual tablet Unknown at Unknown time  Yes Yes   Sig: Place 0.4 mg under the tongue every 5 minutes as needed for chest pain For chest pain place 1 tablet under the tongue every 5 minutes for 3 doses. If symptoms persist 5 minutes after 1st dose call 911.   ondansetron (ZOFRAN-ODT) 4 MG ODT  tab Unknown at Unknown time  Yes Yes   Sig: Take 4 mg by mouth every 6 hours as needed for nausea   raloxifene (EVISTA) 60 MG tablet 5/6/2022 at Unknown time  Yes Yes   Sig: Take 60 mg by mouth daily      Facility-Administered Medications: None      Review of Systems     A 12 point comprehensive review of systems was negative except as noted above in HPI.    PHYSICAL EXAMINATION       Vitals      Temp:  [97.9  F (36.6  C)] 97.9  F (36.6  C)  Pulse:  [59-79] 79  Resp:  [16] 16  BP: (141-200)/(75-81) 154/81  SpO2:  [97 %-98 %] 98 %  Wt Readings from Last 2 Encounters:   05/06/22 48.5 kg (107 lb)   02/22/22 48.5 kg (107 lb)         Examination     General Appearance:  Alert, cooperative, no distress, appears stated age   Head:  Normocephalic, without obvious abnormality, atraumatic   Eyes:  PERRL, conjunctiva/corneas clear, EOM's intact   Nose: Nares normal, septum midline, mucosa normal, no drainage   Throat: Lips, mucosa, and tongue normal; teeth and gums normal   Neck: Supple, symmetrical, trachea midline, no adenopathy, thyroid: not enlarged, symmetric, no carotid bruit or JVD   Back:   Symmetric, no curvature, ROM normal, no CVA tenderness   Lungs:   Clear to auscultation bilaterally, respirations unlabored   Chest Wall:  No tenderness or deformity   Heart:  Regular rate and rhythm, S1, S2 normal,no murmur, rub or gallop   Abdomen:   Soft, non-tender, bowel sounds active all four quadrants,  no masses, no organomegaly   Extremities: Extremities normal, atraumatic, no cyanosis or edema   Skin: Skin color, texture, turgor normal, no rashes or lesions on the visible parts of the skin ( full body skin exam was not conducted)   Neurologic: Alert and oriented X 3, face symmetrical moves all 4 extremities, strength equal bilaterally     Pertinent Lab and Radiology from this Visit      Personally reviewed.  Recent Labs   Lab 05/06/22  1206 05/06/22  1203 05/06/22  1149   WBC 5.5  --   --    HGB 13.6  --   --    *   --   --      --   --    INR 1.52*  --   --      --   --    POTASSIUM 3.7  --   --    CHLORIDE 97*  --   --    CO2 27  --   --    BUN 19  --   --    CR 0.85 0.7  --    ANIONGAP 14  --   --    DONNA 10.8*  --   --    GLC 99  --  97     Recent Results (from the past 24 hour(s))   CTA Head Neck with Contrast    Narrative    EXAM: CTA  HEAD NECK WITH CONTRAST  LOCATION: [facility]  DATE/TIME: 5/6/2022 11:55 AM    INDICATION: Garbled speech.  COMPARISON: None.  CONTRAST: 75 mL [id number]  TECHNIQUE: Head and neck CT angiogram with IV contrast. Noncontrast head CT followed by axial helical CT images of the head and neck vessels obtained during the arterial phase of intravenous contrast administration. Axial 2D reconstructed images and   multiplanar 3D MIP reconstructed images of the head and neck vessels were performed by the technologist. Dose reduction techniques were used. All stenosis measurements made according to NASCET criteria unless otherwise specified.    FINDINGS:   NONCONTRAST HEAD CT:   INTRACRANIAL CONTENTS: No intracranial hemorrhage, extraaxial collection, or mass effect. No CT evidence of acute infarct. Moderate presumed chronic small vessel ischemic changes. Moderate generalized volume loss. No hydrocephalus.     VISUALIZED ORBITS/SINUSES/MASTOIDS: Prior bilateral cataract surgery. Visualized portions of the orbits are otherwise unremarkable. Mucus retention cyst in the left maxillary sinus. No middle ear or mastoid effusion.    BONES/SOFT TISSUES: No acute abnormality.    HEAD CTA:  ANTERIOR CIRCULATION: Atherosclerotic vascular calcifications of the bilateral carotid siphons with mild to moderate narrowing in the cavernous left ICA segment and mild focal narrowing in the supraclinoid left ICA segment. No aneurysm or high-flow   vascular malformation. Persistent fetal circulation of the right posterior cerebral artery.    POSTERIOR CIRCULATION: Mild focal narrowing  involving the distal P1/proximal P2 segment of the left posterior cerebral artery. Dominant left and smaller right vertebral artery contribute to a normal basilar artery.     DURAL VENOUS SINUSES: Expected enhancement of the major dural venous sinuses.    NECK CTA:  RIGHT CAROTID: Atherosclerotic vascular calcifications of the right carotid bifurcation and proximal right ICA without measurable stenosis based on NASCET criteria.    LEFT CAROTID: Atherosclerotic vascular calcifications of the left carotid bifurcation and proximal left ICA with approximately 10% stenosis based on NASCET criteria.    VERTEBRAL ARTERIES: There is severe focal narrowing in the proximal right vertebral artery. Mild atherosclerotic narrowing of the left vertebral artery origin The left vertebral artery is dominant.     AORTIC ARCH: Classic aortic arch anatomy with no significant stenosis at the origin of the great vessels.    NONVASCULAR STRUCTURES: Unremarkable.      Impression    IMPRESSION:   HEAD CT:  1.  No CT evidence for acute intracranial process.  2.  Brain atrophy and presumed chronic microvascular ischemic changes as above.    HEAD CTA:   1.  No significant stenosis, aneurysm, or high flow vascular malformation identified.  2.  Variant [place] of [place] anatomy as above.    NECK CTA:  1.  Approximately 10% stenosis of the left ICA origin based on NASCET criteria.    Imaging findings were discussed with Dr. Hernandez of the clinical service at 12:17 PM on 5/6/2022.         Cardiology      EKG Results: personally reviewed.  and Normal sinus rhythm with frequent PVCs  Echocardiogram   [unfilled]         Lois MD [place]

## 2022-05-07 NOTE — PLAN OF CARE
"Goal Outcome Evaluation:    Plan of Care Reviewed With: patient     Overall Patient Progress: improving         PRIMARY DIAGNOSIS: \"GENERIC\" NURSING  OUTPATIENT/OBSERVATION GOALS TO BE MET BEFORE DISCHARGE:  1. ADLs back to baseline: Yes    2. Activity and level of assistance: Up with standby assistance.    3. Pain status: Pain free.    4. Return to near baseline physical activity: Yes     Discharge Planner Nurse   Safe discharge environment identified: Yes  Barriers to discharge: No    Pt has was alert and oriented x4. Does have a history of dementia per notes. Neuro is intact. No drift or speech issues.       Entered by: Jessica Aguirre RN 05/06/2022 10:47 PM     Please review provider order for any additional goals.   Nurse to notify provider when observation goals have been met and patient is ready for discharge.  "

## 2022-05-07 NOTE — PLAN OF CARE
Physical Therapy Discharge Summary    Reason for therapy discharge:    All goals and outcomes met, no further needs identified.    Progress towards therapy goal(s). See goals on Care Plan in [facility] electronic health record for goal details.  Goals met    Therapy recommendation(s):    Continued therapy is recommended.  Rationale/Recommendations:  Ongoing home PT is recommended to address mobility needs and to reduce significant fall risk.

## 2022-05-07 NOTE — PLAN OF CARE
"Goal Outcome Evaluation:    Plan of Care Reviewed With: patient     Overall Patient Progress: improving       PRIMARY DIAGNOSIS: \"GENERIC\" NURSING  OUTPATIENT/OBSERVATION GOALS TO BE MET BEFORE DISCHARGE:  1. ADLs back to baseline: Yes     2. Activity and level of assistance: Up with standby assistance.     3. Pain status: Pain free.     4. Return to near baseline physical activity: Yes          Discharge Planner Nurse   Safe discharge environment identified: Yes  Barriers to discharge: Yes- continued neuro checks and observations.      Pt became disoriented to time and place. Note shows history of dementia. Neuro assessment intact. Continuing to observe.           Entered by: Jessica Aguirre RN 05/06/2022 11:12 PM  Please review provider order for any additional goals.   Nurse to notify provider when observation goals have been met and patient is ready for discharge.Goal Outcome Evaluation:     Plan of Care Reviewed With: patient      Overall Patient Progress: improving    "

## 2022-05-07 NOTE — DISCHARGE SUMMARY
[facility] MEDICINE  DISCHARGE SUMMARY     Primary Care Physician: Griselda España  Admission Date: 5/6/2022   Discharge Provider: Brisa Gil MD Discharge Date: 5/7/2022   Diet:   Regular Code Status: No CPR- Do NOT Intubate   Activity: DCACTIVITY: Activity as tolerated        Condition at Discharge: Good     REASON FOR PRESENTATION(See Admission Note for Details)   Word finding difficulty    PRINCIPAL & ACTIVE DISCHARGE DIAGNOSES     Transient aphasia  Dementia, needs 24 hours assist  Essential hypertension  Paroxysmal atrial fibrillation  Moderate mitral insufficiency  Mild to moderate tricuspid insufficiency  Dyslipidemia  Hypercalcemia, 10.8-->9  Hypokalemia, replaced    PENDING LABS     Unresulted Labs Ordered in the Past 30 Days of this Admission     No orders found for last 31 day(s).          PROCEDURES ( this hospitalization only)      None    RECOMMENDATIONS TO OUTPATIENT PROVIDER FOR F/U VISIT   Follow-up with primary MD in 1 week  Follow-up with neurology in 1 to 2-week  May consider neuropsych evaluation for underlying dementia  CBC and BMP in 1 week  EEG as out patient      DISPOSITION     Home    SUMMARY OF HOSPITAL COURSE:      Ms.Pauline IRENE Mederos is a 82 year old female with a past medical history of coronary artery disease with coronary artery stent, hypertension, dyslipidemia, dementia, lives independently,  came with difficulty with speech, garbled speech and word finding difficulty.  Did not have focal deficit.  Stroke is ruled out.  MRI scan revealed no acute infarct or intracranial hemorrhage or intracranial lesion.  Has age related generalized brain atrophy and microvascular ischemic changes.  Patient is on optimal stroke prevention medication with anticoagulation treatment with Eliquis, Plavix.  Will resume antihypertensives, statin, Zetia.  She had similar symptoms in last week as well.  Most likely from worsening dementia or could be from  seizure, though less likely.  Will have EEG as outpatient.  She had fluent speech in the hospital.  Received PT and OT.  Needs 24 hours assist.  Discharging home with family support.  Will follow-up with neurology closely as outpatient.  Consider neuropsych evaluation as outpatient.    Had mild hypercalcemia 10.8.  Calcium replacement is on hold.  On hydrochlorothiazide.  Has mild hypokalemia.  Echocardiogram revealed moderate mitral insufficiency, mild to moderate tricuspid insufficiency.  Does not have acute pulmonary congestion.    Discharge Medications with Med changes:     Discharge Medication List as of 5/7/2022  2:23 PM      CONTINUE these medications which have NOT CHANGED    Details   acetaminophen (TYLENOL) 500 MG tablet Take 1,000 mg by mouth every 6 hours as needed for mild pain, Historical      apixaban ANTICOAGULANT (ELIQUIS) 2.5 MG tablet Take 2.5 mg by mouth 2 times daily, Historical      atorvastatin (LIPITOR) 40 MG tablet Take 40 mg by mouth At Bedtime, Historical      clopidogrel (PLAVIX) 75 MG tablet Take 75 mg by mouth daily, Historical      ezetimibe (ZETIA) 10 MG tablet Take 10 mg by mouth daily, Historical      hydrochlorothiazide (HYDRODIURIL) 50 MG tablet Take 50 mg by mouth daily, Historical      magnesium oxide (MAG-OX) 400 MG tablet Take 400 mg by mouth daily, Historical      metoprolol tartrate (LOPRESSOR) 25 MG tablet Take 25 mg by mouth 2 times daily, Historical      nitroGLYcerin (NITROSTAT) 0.4 MG sublingual tablet Place 0.4 mg under the tongue every 5 minutes as needed for chest pain For chest pain place 1 tablet under the tongue every 5 minutes for 3 doses. If symptoms persist 5 minutes after 1st dose call 911., Historical      ondansetron (ZOFRAN-ODT) 4 MG ODT tab Take 4 mg by mouth every 6 hours as needed for nausea, Historical      raloxifene (EVISTA) 60 MG tablet Take 60 mg by mouth daily, Historical      Vitamin D3 (CHOLECALCIFEROL) 25 mcg (1000 units) tablet Take 50 mcg by  mouth daily, Historical         STOP taking these medications       calcium citrate 250 MG TABS Comments:   Reason for Stopping:                     Rationale for medication changes:      Calcium supplement is on hold        Consults   PT and OT    Immunizations given this encounter     Most Recent Immunizations   Administered Date(s) Administered     COVID-19,PF,Moderna 02/25/2021     FLUAD(HD)65+ QUAD 10/19/2021     Flu, Unspecified 10/26/2018     D2b7-27 Novel Flu 01/04/2010     Influenza (High Dose) 3 valent vaccine 10/08/2020     Influenza (IIV3) PF 10/28/2015     Influenza Vaccine IM > 6 months Valent IIV4 (Alfuria,Fluzone) 10/01/2021     Influenza Vaccine, 6+MO IM (QUADRIVALENT W/PRESERVATIVES) 10/26/2016     Influenza, Quad, High Dose, Pf, 65yr+ (Fluzone HD) 10/08/2020     Pneumo Conj 13-V (2010&after) 03/19/2015     Pneumococcal 23 valent 10/02/2006     TD (ADULT, 7+) 04/15/2011     Td (Adult), Adsorbed 01/21/2011     Tdap (Adacel,Boostrix) 03/19/2015     Zoster vaccine recombinant adjuvanted (SHINGRIX) 11/14/2019     Zoster vaccine, live 04/15/2011           Anticoagulation Information    Eliquis 2.5 mg twice a day      SIGNIFICANT IMAGING FINDINGS     Results for orders placed or performed during the hospital encounter of 05/06/22   CTA Head Neck with Contrast    Impression    IMPRESSION:   HEAD CT:  1.  No CT evidence for acute intracranial process.  2.  Brain atrophy and presumed chronic microvascular ischemic changes as above.    HEAD CTA:   1.  No significant stenosis, aneurysm, or high flow vascular malformation identified.  2.  Variant [place] anatomy as above.    NECK CTA:  1.  Approximately 10% stenosis of the left ICA origin based on NASCET criteria.    Imaging findings were discussed with Dr. Hernandez of the clinical service at 12:17 PM on 5/6/2022.   MR Brain w/o & w Contrast    Impression    IMPRESSION:  1.  No acute infarct, intracranial hemorrhage, or intracranial mass.  2.   Generalized brain atrophy and presumed microvascular ischemic changes as detailed above.     Echocardiogram  1. Normal left ventricular size and systolic performance with a visually  estimated ejection fraction of 50-55%.  2. There is moderate mitral insufficiency.  3. There is mild to moderate tricuspid insufficiency.  4. Borderline right ventricular enlargement with low normal right ventricular  systolic performance.  5. There is moderate left atrial enlargement. There is mild to moderate right atrial enlargement.    SIGNIFICANT LABORATORY FINDINGS     LDL 62, total cholesterol 129  Hemoglobin A1c 5.2  TSH 3.6  Hemoglobin 13.6    Discharge Orders        HC EEG ROUTINE     Home Care Referral      Adult Neurology  Referral      Adult Neuropsychology Referral      Reason for your hospital stay    Word finding difficulty     Follow-up and recommended labs and tests     Follow-up with primary MD in 1 week  Follow-up with neurology in 1 to 2-week  May consider neuropsych evaluation  CBC and BMP in 1 week     Activity    Your activity upon discharge: activity as tolerated     Diet    Follow this diet upon discharge: regular       Examination   Physical Exam   Temp:  [97.1  F (36.2  C)-97.9  F (36.6  C)] 97.1  F (36.2  C)  Pulse:  [61-82] 63  Resp:  [16-20] 20  BP: (112-166)/(59-88) 147/87  SpO2:  [94 %-99 %] 95 %  Wt Readings from Last 1 Encounters:   05/07/22 48.6 kg (107 lb 3.2 oz)     GENERAL:  Alert, appears comfortable, in no acute distress, appears stated age   HEAD:  Normocephalic, without obvious abnormality, atraumatic   EYES:  PERRL, conjunctiva  clear,  EOM's intact   NOSE: Nares normal,  mucosa normal, no drainage   THROAT: Lips, mucosa,  gums normal, mouth moist   NECK: Supple, symmetrical, trachea midline   BACK:   Symmetric, no curvature, ROM normal   LUNGS:   Clear to auscultation bilaterally, no rales, rhonchi, or wheezing, symmetric chest rise on inhalation, respirations unlabored   CHEST WALL:   No tenderness or deformity   HEART:  Regular rate and rhythm, S1 and S2 normal, no murmur     ABDOMEN:   Soft, non-tender, bowel sounds active , no masses, no organomegaly    EXTREMITIES: No  edema    SKIN: No rashes   NEURO: Alert, oriented x3, moves all four extremities freely, normal fluent speech   PSYCH: Cooperative, behavior is appropriate           Please see EMR for more detailed significant labs, imaging, consultant notes etc.    IBrisa MD, personally saw the patient today and spent greater than 30 minutes discharging this patient.    Brisa Gil MD  [facility]    CC:Soma, Griselda V

## 2022-05-07 NOTE — PROGRESS NOTES
Care Management Discharge Note    Discharge Date: 05/07/2022       Discharge Disposition: Home with family    Discharge Services:  With family    Discharge Transportation: family or friend will provide    Education Provided on the Discharge Plan:  ANV per bedside RN.    Persons Notified of Discharge Plans: patient and son    Patient/Family in Agreement with the Plan: yes    Handoff Referral Completed: Yes    Additional Information:  Chart reviewed. CM met with son. Patient lives with family. Son and daughter take turns caring for the patient. She has 24 hour supervision. Son denied additional needs from CM. Son will provide transportation home at time of hospital discharge.         Darlene Carson RN

## 2022-05-07 NOTE — PLAN OF CARE
"Goal Outcome Evaluation:    Plan of Care Reviewed With: patient     Overall Patient Progress: improving     Pt was alert and oriented x4 in beginning of shift. Throughout the night pt was disoriented to time and intermittently to place. Neuro intact, no slurred or garbled speech throughout the night. Denies any pain. SBA with walker and gb to bathroom, voiding spontaneously.      Problem: Plan of Care - These are the overarching goals to be used throughout the patient stay.    Goal: Plan of Care Review/Shift Note  Description: The Plan of Care Review/Shift note should be completed every shift.  The Outcome Evaluation is a brief statement about your assessment that the patient is improving, declining, or no change.  This information will be displayed automatically on your shift note.  5/7/2022 0603 by Jessica Aguirre RN  Outcome: Ongoing, Progressing  5/6/2022 2310 by Jessica Aguirre RN  Outcome: Ongoing, Progressing  5/6/2022 2241 by Jessica Aguirre RN  Outcome: Ongoing, Progressing  Flowsheets (Taken 5/6/2022 2241)  Plan of Care Reviewed With: patient  Overall Patient Progress: improving  Goal: Patient-Specific Goal (Individualized)  Description: You can add care plan individualizations to a care plan. Examples of Individualization might be:  \"Parent requests to be called daily at 9am for status\", \"I have a hard time hearing out of my right ear\", or \"Do not touch me to wake me up as it startles me\".  5/7/2022 0603 by Jessica Aguirre RN  Outcome: Ongoing, Progressing  Flowsheets (Taken 5/7/2022 0135)  Individualized Care Needs: none  5/6/2022 2310 by Jessica Aguirre RN  Outcome: Ongoing, Progressing  5/6/2022 2241 by Jessica Aguirre RN  Outcome: Ongoing, Progressing  Goal: Absence of Hospital-Acquired Illness or Injury  5/7/2022 0603 by Jessica Aguirre RN  Outcome: Ongoing, Progressing  5/6/2022 2310 by Jessica Aguirre, THANH  Outcome: Ongoing, Progressing  5/6/2022 2241 by Timothy, " Jessica DUMONT RN  Outcome: Ongoing, Progressing  Intervention: Identify and Manage Fall Risk  Recent Flowsheet Documentation  Taken 5/7/2022 0455 by Jessica Aguirre RN  Safety Promotion/Fall Prevention:   bed alarm on   assistive device/personal items within reach   clutter free environment maintained   room near nurse's station   room organization consistent   safety round/check completed  Taken 5/6/2022 2301 by Jessica Aguirre RN  Safety Promotion/Fall Prevention:   bed alarm on   assistive device/personal items within reach   clutter free environment maintained   room near nurse's station   room organization consistent   safety round/check completed  Taken 5/6/2022 2020 by Jessica Aguirre RN  Safety Promotion/Fall Prevention:   assistive device/personal items within reach   bed alarm on   activity supervised   increased rounding and observation   fall prevention program maintained   clutter free environment maintained   nonskid shoes/slippers when out of bed   mobility aid in reach  Intervention: Prevent Skin Injury  Recent Flowsheet Documentation  Taken 5/7/2022 0455 by Jessica Aguirre RN  Body Position:   position maintained   supine  Taken 5/6/2022 2301 by Jessica Aguirre RN  Body Position:   position maintained   supine  Taken 5/6/2022 2020 by Jessica Aguirre RN  Body Position:   position changed independently   supine, head elevated  Intervention: Prevent and Manage VTE (Venous Thromboembolism) Risk  Recent Flowsheet Documentation  Taken 5/7/2022 0455 by Jessica Aguirre RN  VTE Prevention/Management: SCDs (sequential compression devices) off  Activity Management: activity adjusted per tolerance  Taken 5/6/2022 2301 by Jessica Aguirre RN  VTE Prevention/Management: SCDs (sequential compression devices) off  Activity Management: activity adjusted per tolerance  Taken 5/6/2022 2020 by Jessica Aguirre RN  VTE Prevention/Management: SCDs (sequential compression devices)  off  Activity Management: activity adjusted per tolerance  Intervention: Prevent Infection  Recent Flowsheet Documentation  Taken 5/7/2022 0455 by Jessica Aguirre RN  Infection Prevention:   single patient room provided   rest/sleep promoted  Taken 5/6/2022 2301 by Jessica Aguirre RN  Infection Prevention:   single patient room provided   rest/sleep promoted  Goal: Optimal Comfort and Wellbeing  5/7/2022 0603 by Jessica Aguirre RN  Outcome: Ongoing, Progressing  5/6/2022 2310 by Jessica Aguirre RN  Outcome: Ongoing, Progressing  5/6/2022 2241 by Jessica Aguirre RN  Outcome: Ongoing, Progressing  Goal: Readiness for Transition of Care  5/7/2022 0603 by Jessica Aguirre RN  Outcome: Ongoing, Progressing  5/6/2022 2310 by Jessica Aguirre RN  Outcome: Ongoing, Progressing  5/6/2022 2241 by Jessica Aguirre RN  Outcome: Ongoing, Progressing

## 2022-05-07 NOTE — PLAN OF CARE
"PRIMARY DIAGNOSIS: \"GENERIC\" NURSING  OUTPATIENT/OBSERVATION GOALS TO BE MET BEFORE DISCHARGE:  1. ADLs back to baseline: Yes    2. Activity and level of assistance: Up with standby assistance.    3. Pain status: Pain free.    4. Return to near baseline physical activity: Yes     Discharge Planner Nurse   Safe discharge environment identified: Yes  Barriers to discharge: Yes- continued neuro checks and observations.     Pt became disoriented to time and place. Note shows history of dementia. Neuro assessment intact. Continuing to observe.          Entered by: Jessica Aguirre RN 05/06/2022 11:12 PM     Please review provider order for any additional goals.   Nurse to notify provider when observation goals have been met and patient is ready for discharge.Goal Outcome Evaluation:    Plan of Care Reviewed With: patient     Overall Patient Progress: improving           "

## 2022-05-07 NOTE — PROGRESS NOTES
[facility] MEDICINE PROGRESS NOTE      Identification/Summary: Mary Mederos is a 82 year old female with a past medical history of coronary artery disease with coronary artery stent, hypertension, dyslipidemia, dementia, lives independently,  came with difficulty with speech, garbled speech and word finding difficulty.  Did not have focal deficit.  Stroke is ruled out.  MRI scan revealed no acute infarct, intracranial hemorrhage or intracranial lesion.  Has generalized brain atrophy and microvascular ischemic changes.  Getting PT and OT.  Patient is on optimal stroke prevention medication with anticoagulation treatment with Eliquis, Plavix.  Will resume antihypertensive, statin, Zetia.  She had similar symptoms in last week as well.  Most likely from worsening dementia or could be from seizure.  Will have EEG prior to discharge. Will follow-up with neurology closely as outpatient.    Assessment and Plan:  Transient aphasia, resolved  Brain MRI is negative for acute stroke  Already on optimal stroke prevention medication with anticoagulation treatment and Plavix  Could be worsening dementia  EEG to rule out seizure  Will follow-up with neurology as outpatient    Essential hypertension  Blood pressure is elevated  Metoprolol 25 mg twice a day  Hydrochlorothiazide 50 mg daily    Paroxysmal atrial fibrillation  Heart rate is stable with metoprolol  On chronic anticoagulation treatment with Eliquis    Moderate mitral insufficiency  Mild to moderate tricuspid insufficiency  No acute pulmonary congestion    Dyslipidemia  Resume Lipitor 40 mg daily  Resume Zetia 10 mg daily    Hypercalcemia, replacement is on hold    Code DNR  DVT prophylaxis  On chronic anticoagulation treatment  Barrier to discharge  Awaiting for more clinical improvement.  EEG is pending. Anticipated discharge later today    Brisa Gil MD  [facility]  [facility]  [facility]  Phone:  #[phone]    Interval History/Subjective:  Came with word finding difficulty.  Had similar symptoms 1 week ago.  Patient is communicative, pleasant.  No other concern.  Getting PT and OT.  Likely be discharging home later today.  Called son twice, unable to reach    Physical Exam/Objective:  Temp:  [97.2  F (36.2  C)-97.9  F (36.6  C)] 97.2  F (36.2  C)  Pulse:  [59-82] 63  Resp:  [16-19] 18  BP: (112-200)/(59-88) 166/83  SpO2:  [94 %-99 %] 96 %  Body mass index is 18.4 kg/m .    GENERAL:  Alert, appears comfortable, in no acute distress, appears stated age   HEAD:  Normocephalic, without obvious abnormality, atraumatic   EYES:  PERRL, conjunctiva  clear,  EOM's intact   NOSE: Nares normal,  mucosa normal, no drainage   THROAT: Lips, mucosa,  gums normal, mouth moist   NECK: Supple, symmetrical, trachea midline   BACK:   Symmetric, no curvature, ROM normal   LUNGS:   Clear to auscultation bilaterally, no rales, rhonchi, or wheezing, symmetric chest rise on inhalation, respirations unlabored   CHEST WALL:  No tenderness or deformity   HEART:  Regular rate and rhythm, S1 and S2 normal, no murmur     ABDOMEN:   Soft, non-tender, bowel sounds active , no masses, no organomegaly    EXTREMITIES: No  edema    SKIN: No rashes   NEURO: Alert, oriented x3, moves all four extremities freely, normal fluent speech   PSYCH: Cooperative, behavior is appropriate      Data reviewed today: I personally reviewed all new medications, labs, imaging/diagnostics reports over the past 24 hours. Pertinent findings include:    Imaging:   MRI brain  1.  No acute infarct, intracranial hemorrhage, or intracranial mass.  2.  Generalized brain atrophy and presumed microvascular ischemic changes as detailed above.    HEAD CT:  1.  No CT evidence for acute intracranial process.  2.  Brain atrophy and presumed chronic microvascular ischemic changes as above.     HEAD CTA:   1.  No significant stenosis, aneurysm, or high flow vascular  malformation identified.  2.  Variant [place] anatomy as above.     NECK CTA:  1.  Approximately 10% stenosis of the left ICA origin based on NASCET criteria.    Echocardiogram  1. Normal left ventricular size and systolic performance with a visually  estimated ejection fraction of 50-55%.  2. There is moderate mitral insufficiency.  3. There is mild to moderate tricuspid insufficiency.  4. Borderline right ventricular enlargement with low normal right ventricular  systolic performance.  5. There is moderate left atrial enlargement. There is mild to moderate right atrial enlargement.  ______________________________________________________________________________      Labs:  Most Recent 3 CBC's:Recent Labs   Lab Test 05/06/22  1206 02/08/22  0904 02/01/22  0659   WBC 5.5 4.2 3.1*   HGB 13.6 13.7 12.7   * 103* 100    224 245     Most Recent 3 BMP's:Recent Labs   Lab Test 05/07/22  0752 05/07/22  0422 05/06/22  2232 05/06/22  1809 05/06/22  1206 05/06/22  1203 05/06/22  1149 02/04/22  0652 02/03/22  0650 02/01/22  0659   NA  --  136  --   --  138  --   --   --   --  132*   POTASSIUM  --  3.1*  --   --  3.7  --   --  3.9   < > 3.0*   CHLORIDE  --  98  --   --  97*  --   --   --   --  95*   CO2  --  25  --   --  27  --   --   --   --  28   BUN  --  11  --   --  19  --   --   --   --  23   CR  --  0.63  --   --  0.85 0.7  --   --   --  0.67   ANIONGAP  --  13  --   --  14  --   --   --   --  9   DONNA  --  9.0  --   --  10.8*  --   --   --   --  9.3   GLC 93 84 90   < > 99  --    < >  --   --  85    < > = values in this interval not displayed.       Medications:   Personally Reviewed.  Medications     - MEDICATION INSTRUCTIONS -         apixaban ANTICOAGULANT  2.5 mg Oral BID     atorvastatin  40 mg Oral At Bedtime     clopidogrel  75 mg Oral Daily     ezetimibe  10 mg Oral Daily     hydrochlorothiazide  50 mg Oral Daily     magnesium oxide  400 mg Oral Daily     metoprolol tartrate  25 mg Oral BID      raloxifene  60 mg Oral Daily     Vitamin D3  50 mcg Oral Daily

## 2022-05-07 NOTE — PROGRESS NOTES
"Vss other than BP's which are elevated in the 160's. Disoriented to time and fluctuates on confusion around situation and being in the hospital. Denies pain. Will continue to monitor.   PRIMARY DIAGNOSIS: \"GENERIC\" NURSING  OUTPATIENT/OBSERVATION GOALS TO BE MET BEFORE DISCHARGE:  1. ADLs back to baseline: Yes    2. Activity and level of assistance: Up with standby assistance.    3. Pain status: Pain free.    4. Return to near baseline physical activity: Yes     Discharge Planner Nurse   Safe discharge environment identified: Yes (home with son)  Barriers to discharge: Yes (continued neuro checks and observation)       Entered by: Griselda Gibson RN 05/07/2022 10:06 AM     Please review provider order for any additional goals.   Nurse to notify provider when observation goals have been met and patient is ready for discharge.  "

## 2022-05-07 NOTE — CONSULTS
"    [facility]    Stroke Telephone Note    I spoke with Dr. Gil in regards to Mary Mederos. Since the patient has had 2 episodes of word finding difficulty and garbled speech, that happened this week and also last week, this could possibly represent seizures vs worsening dementia.     The patient is also on optimal stroke prevention medications with Eliquis and also Plavix.     Recommendations  - Rule out seizures - EEG, can be followed by general neuro       We will sign off. Please call with any additional questions.    LACEY Connors, CNP  Vascular Neurology  To page me or covering stroke neurology team member, click here: [facility]COM   Choose \"On Call\" tab at top, then search dropdown box for \"Neurology Adult\", select location, press Enter, then look for stroke/neuro ICU/telestroke.         "

## 2022-05-07 NOTE — PLAN OF CARE
Vss. Pt denies pain. PT/OT recommended home with 24-hr assistance which the patient's family will be able to provide with the help of her son and daughter.     Pt adequate for discharge. AVS discussed with patient and patient's son. All questions answered. Verbalized understanding. PIV removed. All belongings packed. Pt discharging home with son.

## 2022-05-07 NOTE — PROGRESS NOTES
[facility]      OUTPATIENT PHYSICAL THERAPY EVALUATION  PLAN OF TREATMENT FOR OUTPATIENT REHABILITATION  (COMPLETE FOR INITIAL CLAIMS ONLY)  Patient's Last Name, First Name, M.I.  YOB: 1940  Mary Mederos                        Provider's Name  [facility] Medical Record No.  [id number]                               Onset Date:  05/06/22   Start of Care Date:  05/07/22      Type:     _X_PT   ___OT   ___SLP Medical Diagnosis:  dementia, aphasia                        PT Diagnosis:  impaired functional mobility; fall risk   Visits from SOC:  1   _________________________________________________________________________________  Plan of Treatment/Functional Goals    Planned Interventions: transfer training, stair training, gait training     Goals: See Physical Therapy Goals on Care Plan in [facility] electronic health record.    Therapy Frequency: One time eval and treatment only  Predicted Duration of Therapy Intervention: 05/07/22  _________________________________________________________________________________    I CERTIFY THE NEED FOR THESE SERVICES FURNISHED UNDER        THIS PLAN OF TREATMENT AND WHILE UNDER MY CARE     (Physician co-signature of this document indicates review and certification of the therapy plan).              Certification date from: 05/07/22, Certification date to: 06/10/22    Referring Physician: Brisa Gil            Initial Assessment        See Physical Therapy evaluation dated 05/07/22 in Epic electronic health record.

## 2022-05-07 NOTE — PROGRESS NOTES
05/07/22 1334   Quick Adds   Quick Adds Certification   Type of Visit Initial PT Evaluation   Living Environment   People in Home child(alie), adult   Current Living Arrangements house   Living Environment Comments Patient states that she lives alternately with her daughter and her son. RN confirms. Pt reportts that there are stairs at both houses but it is unclear whether she needs to use these stairs.   Self-Care   Equipment Currently Used at Home walker, rolling   Fall history within last six months yes   Activity/Exercise/Self-Care Comment Patient reports that she is independent with bed mobility, sit to stand, ambulation and that she sometimes climbs and descends stairs at her son and daughter's houses. After evaluation, Rufus, the pt's son, arrived and informed that stairs are entirely optional at both houses and that assistance is always available when pt needs to climb stairs. He also affirmed that 24/7 supervision is provided at both houses, that memory care is under consideration, and that the pt's brother in law has arranged home PT and OT services. Son reports that patient fell and broke her sacrum last year, which precipitated move to her childrens' houses.   General Information   Onset of Illness/Injury or Date of Surgery 05/06/22   Referring Physician Brisa Gil   Patient/Family Therapy Goals Statement (PT) Patient would like to return home to daughter and son's houses. These family members have told nursing that they want this as well, but that the family is also looking into memory care.   Pertinent History of Current Problem (include personal factors and/or comorbidities that impact the POC) Aphasia, dementia   Cognition   Affect/Mental Status (Cognition) confused;emotionally labile  (Patient alternately cooperative, then uncooperative. Explained to patient multiple times the necessity of physical therapy eval, vis order by physician. Patient reports that she has a physical therapist at home  and so does not need or want another one.)   Orientation Status (Cognition) disoriented to;place;person;situation;time   Follows Commands (Cognition) 25-49% accuracy;delayed response/completion;increased processing time needed;repetition of directions required;verbal cues/prompting required;refused to attempt   Behavioral Issues uncooperative   Safety Deficit (Cognition) severe deficit;ability to follow commands;awareness of need for assistance;impulsivity;insight into deficits/self-awareness;safety precautions awareness;safety precautions follow-through/compliance   Posture    Posture Forward head position;Protracted shoulders;Kyphosis;Lordosis;Scoliosis   Strength (Manual Muscle Testing)   Strength Comments generalized weakness   Transfers   Transfers sit-stand transfer   Transfer Safety Concerns Noted decreased balance during turns;decreased sequencing ability  (difficulty maintaining walker positioing)   Impairments Contributing to Impaired Transfers impaired balance;impaired coordination;impaired motor control;impaired postural control;decreased strength   Sit-Stand Transfer   Sit-Stand [place] (Transfers) nonverbal cues (demo/gesture);verbal cues;supervision   Assistive Device (Sit-Stand Transfers) walker, front-wheeled   Gait/Stairs (Locomotion)   [place] Level (Gait) supervision;verbal cues;nonverbal cues (demo/gesture);contact guard   Assistive Device (Gait) walker, front-wheeled   Distance in Feet (Required for LE Total Joints) 150,150   Pattern (Gait) step-through   Deviations/Abnormal Patterns (Gait)   (slow gait, stopped posture, walker far in front)   Maintains Weight-bearing Status (Gait) able to maintain   Negotiation (Stairs) stairs independence;handrail location;number of steps;ascending technique;descending technique   [place] Level (Stairs) set up;supervision;verbal cues;contact guard;nonverbal cues (demo/gesture)   Handrail Location (Stairs) right side (ascending)   Number of  Steps (Stairs) 4   Ascending Technique (Stairs) step-to-step   Descending Technique (Stairs) step-to-step   Comment, (Gait/Stairs) asends and descends stairs with both hands on R rail up and down; difficulty placing feet accurately.   Balance   Balance Comments Fair balance   Clinical Impression   Criteria for Skilled Therapeutic Intervention Yes, treatment indicated   PT Diagnosis (PT) impaired functional mobility; fall risk   Influenced by the following impairments weakness, impaired balance, impaired cognitiion,   Functional limitations due to impairments impaired ambulation, transfers, stairs   Clinical Presentation (PT Evaluation Complexity) Evolving/Changing   Clinical Presentation Rationale presents as diagnosed   Clinical Decision Making (Complexity) moderate complexity   Planned Therapy Interventions (PT) transfer training;stair training;gait training   Risk & Benefits of therapy have been explained evaluation/treatment results reviewed;care plan/treatment goals reviewed;risks/benefits reviewed;current/potential barriers reviewed;participants voiced agreement with care plan;participants included;patient;son   Clinical Impression Comments Patient is at great risk for falls. Requires 24/7 supervision to prevent falls and other accidents.   PT Discharge Planning   PT Discharge Recommendation (DC Rec) (S)  home with assist;home with home care physical therapy;Transitional Care Facility;Long term care facility   PT Rationale for DC Rec Patient requires 24/7 supervision due to high fall risk; if this is provided at family residences, then patient can return safely to her son's and daughter's houses; other discharge options include TCU and long-term (memory) care.   Plan of Care Review   Plan of Care Reviewed With patient;son   Therapy Certification   Start of care date 05/07/22   Certification date from 05/07/22   Certification date to 06/10/22   Medical Diagnosis dementia, aphasia   Total Evaluation Time   Total  Evaluation Time (Minutes) 25   Physical Therapy Goals   PT Frequency One time eval and treatment only   PT Predicted Duration/Target Date for Goal Attainment 05/07/22   PT Goals Transfers;Gait;Stairs   PT: Transfers Goal Met;Sit to/from stand;Supervision/stand-by assist   PT: Gait 150 feet;Goal Met   PT: Stairs Goal Met;4 stairs;Supervision/stand-by assist;Minimal assist

## 2022-05-07 NOTE — PROGRESS NOTES
05/07/22 1020   Quick Adds   Quick Adds Certification   Type of Visit Initial Occupational Therapy Evaluation   Living Environment   People in Home child(alie), adult   Living Environment Comments pt reports she goes between living with her dtr and son   Self-Care   Equipment Currently Used at Home walker, rolling   Fall history within last six months yes   Activity/Exercise/Self-Care Comment pt reports indep with ADLs except for showering- dtr helps. dtr/son manage all IADLs per pt report   General Information   Onset of Illness/Injury or Date of Surgery 05/06/22   Referring Physician isis   Patient/Family Therapy Goal Statement (OT) go home   Additional Occupational Profile Info/Pertinent History of Current Problem TIA, transient aphasia; dementia   Existing Precautions/Restrictions fall   Limitations/Impairments safety/cognitive   Cognitive Status Examination   Orientation Status place   Affect/Mental Status (Cognitive) WNL;confused   Memory Deficit moderate deficit   Cognitive Status Comments pleasantly confused, stated age 46 and year 1924. no aphasic/word finding/speech difficulties observed   Visual Perception   Impact of Vision Impairment on Function (Vision) appears intact   Sensory   Sensory Quick Adds No deficits were identified   Pain Assessment   Patient Currently in Pain No   Range of Motion Comprehensive   General Range of Motion no range of motion deficits identified   Strength Comprehensive (MMT)   Comment, General Manual Muscle Testing (MMT) Assessment WFL for ADLs   Coordination   Upper Extremity Coordination No deficits were identified   Transfers   Transfers sit-stand transfer;toilet transfer   Transfer Comments SBA   Balance   Balance Assessment no deficits were identified   Activities of Daily Living   BADL Assessment/Intervention toileting;grooming   Grooming Assessment/Training   Comment, (Grooming) SBA, no verbal cues   Toileting   Assistive Devices (Toileting) grab bar, toilet    Comment, (Toileting) SBA   Clinical Impression   Criteria for Skilled Therapeutic Interventions Met (OT) Evaluation only   Influenced by the following impairments TIA, transient aphasia; dementia   OT Problem List-Impairments impacting ADL cognition   Assessment of Occupational Performance 1-3 Performance Deficits   Identified Performance Deficits safety/functional cognition with ADLs   Clinical Decision Making Complexity (OT) low complexity   Risk & Benefits of therapy have been explained evaluation/treatment results reviewed;patient   OT Discharge Planning   OT Discharge Recommendation (DC Rec) (S)  home with assist  (24 hour supervision)   OT Rationale for DC Rec pt appears at baseline level for ADLs, safe to return home with 24 hr supervision from dtr/son   OT Brief overview of current status OT d/c   Therapy Certification   Start of Care Date 05/07/22   Certification date from 05/07/22   Certification date to 05/14/22   Medical Diagnosis TIA, transient aphasia, dementia   Total Evaluation Time (Minutes)   Total Evaluation Time (Minutes) 10   Vi Jimenez, OTR/L

## 2022-05-07 NOTE — PROGRESS NOTES
[facility]      OUTPATIENT OCCUPATIONAL THERAPY  EVALUATION  PLAN OF TREATMENT FOR OUTPATIENT REHABILITATION  (COMPLETE FOR INITIAL CLAIMS ONLY)  Patient's Last Name, First Name, M.I.  YOB: 1940  Mary Mederos                          Provider's Name  [facility] Medical Record No.  [id number]                               Onset Date:  05/06/22   Start of Care Date:  05/07/22     Type:     ___PT   _X_OT   ___SLP Medical Diagnosis:  TIA, transient aphasia, dementia                        OT Diagnosis:      Visits from SOC:  1   _________________________________________________________________________________  Plan of Treatment/Functional Goals    Planned Interventions:     Goals: See Occupational Therapy Goals on Care Plan in [facility] electronic health record.    Therapy Frequency:    Predicted Duration of Therapy Intervention:    _________________________________________________________________________________    I CERTIFY THE NEED FOR THESE SERVICES FURNISHED UNDER        THIS PLAN OF TREATMENT AND WHILE UNDER MY CARE     (Physician co-signature of this document indicates review and certification of the therapy plan).              Certification date from: 05/07/22, Certification date to: 05/14/22    Referring Physician: isis            Initial Assessment        See Occupational Therapy evaluation dated 05/07/22 in Epic electronic health record.

## 2022-05-09 NOTE — PROGRESS NOTES
Clinic Care Coordination Contact  [facility]/Voicemail       Clinical Data: Care Coordinator Outreach  Reason for referral: TCM outreach  Outreach attempted x 1.  Left message on patient's voicemail with call back information and requested return call.  Plan:  Care Coordinator will try to reach patient again in 1-2 business days.       Cynthia Hatch  Community Health Worker  [facility] [place]  Ph: [phone]

## 2022-05-10 NOTE — PROGRESS NOTES
Clinic Care Coordination Contact  [facility]/Voicemail       Clinical Data: Care Coordinator Outreach  Reason for referral: TCM outreach  Outreach attempted x 2.  Left message on patient's voicemail with call back information.  Plan:  Care Coordinator will do no further outreaches at this time.     Cynthia Hatch  Community Health Worker  [facility] [place]  Ph: [phone]